# Patient Record
Sex: FEMALE | Race: WHITE | NOT HISPANIC OR LATINO | Employment: FULL TIME | URBAN - METROPOLITAN AREA
[De-identification: names, ages, dates, MRNs, and addresses within clinical notes are randomized per-mention and may not be internally consistent; named-entity substitution may affect disease eponyms.]

---

## 2023-12-29 ENCOUNTER — PATIENT ROUNDING (BHMG ONLY) (OUTPATIENT)
Dept: FAMILY MEDICINE CLINIC | Facility: CLINIC | Age: 33
End: 2023-12-29
Payer: OTHER GOVERNMENT

## 2023-12-29 ENCOUNTER — OFFICE VISIT (OUTPATIENT)
Dept: FAMILY MEDICINE CLINIC | Facility: CLINIC | Age: 33
End: 2023-12-29
Payer: OTHER GOVERNMENT

## 2023-12-29 VITALS
DIASTOLIC BLOOD PRESSURE: 70 MMHG | BODY MASS INDEX: 32.99 KG/M2 | WEIGHT: 198 LBS | TEMPERATURE: 97.8 F | HEIGHT: 65 IN | HEART RATE: 78 BPM | RESPIRATION RATE: 18 BRPM | OXYGEN SATURATION: 100 % | SYSTOLIC BLOOD PRESSURE: 106 MMHG

## 2023-12-29 DIAGNOSIS — Z76.89 ENCOUNTER TO ESTABLISH CARE: ICD-10-CM

## 2023-12-29 DIAGNOSIS — E66.9 CLASS 1 OBESITY WITH BODY MASS INDEX (BMI) OF 32.0 TO 32.9 IN ADULT, UNSPECIFIED OBESITY TYPE, UNSPECIFIED WHETHER SERIOUS COMORBIDITY PRESENT: ICD-10-CM

## 2023-12-29 DIAGNOSIS — Z11.59 NEED FOR HEPATITIS C SCREENING TEST: ICD-10-CM

## 2023-12-29 DIAGNOSIS — L73.2 HIDRADENITIS SUPPURATIVA: Primary | ICD-10-CM

## 2023-12-29 PROBLEM — K64.4 EXTERNAL HEMORRHOIDS: Status: ACTIVE | Noted: 2022-12-19

## 2023-12-29 PROBLEM — F90.2 ATTENTION DEFICIT HYPERACTIVITY DISORDER, COMBINED TYPE: Status: ACTIVE | Noted: 2022-05-17

## 2023-12-29 PROBLEM — Z63.0 PROBLEMS IN RELATIONSHIP WITH SPOUSE OR PARTNER: Status: RESOLVED | Noted: 2018-03-29 | Resolved: 2023-12-29

## 2023-12-29 PROBLEM — Z02.89 HEALTH EXAMINATION OF DEFINED SUBPOPULATION: Status: RESOLVED | Noted: 2023-12-29 | Resolved: 2023-12-29

## 2023-12-29 PROBLEM — E53.8 VITAMIN B12 DEFICIENCY (NON ANEMIC): Status: ACTIVE | Noted: 2022-03-29

## 2023-12-29 PROBLEM — F50.819 BINGE EATING DISORDER: Status: ACTIVE | Noted: 2022-03-28

## 2023-12-29 PROBLEM — F43.29 MIXED EMOTIONAL FEATURES AS ADJUSTMENT REACTION: Status: ACTIVE | Noted: 2018-02-02

## 2023-12-29 PROBLEM — L70.0 CYSTIC ACNE: Status: ACTIVE | Noted: 2021-04-12

## 2023-12-29 PROBLEM — H52.10 MYOPIA: Status: RESOLVED | Noted: 2023-12-29 | Resolved: 2023-12-29

## 2023-12-29 PROBLEM — F32.9 MAJOR DEPRESSION, SINGLE EPISODE: Status: ACTIVE | Noted: 2023-12-29

## 2023-12-29 PROBLEM — F50.81 BINGE EATING DISORDER: Status: ACTIVE | Noted: 2022-03-28

## 2023-12-29 PROCEDURE — 86803 HEPATITIS C AB TEST: CPT | Performed by: NURSE PRACTITIONER

## 2023-12-29 PROCEDURE — 82607 VITAMIN B-12: CPT | Performed by: NURSE PRACTITIONER

## 2023-12-29 PROCEDURE — 99204 OFFICE O/P NEW MOD 45 MIN: CPT | Performed by: NURSE PRACTITIONER

## 2023-12-29 PROCEDURE — 36415 COLL VENOUS BLD VENIPUNCTURE: CPT | Performed by: NURSE PRACTITIONER

## 2023-12-29 PROCEDURE — 81001 URINALYSIS AUTO W/SCOPE: CPT | Performed by: NURSE PRACTITIONER

## 2023-12-29 PROCEDURE — 84443 ASSAY THYROID STIM HORMONE: CPT | Performed by: NURSE PRACTITIONER

## 2023-12-29 PROCEDURE — 80053 COMPREHEN METABOLIC PANEL: CPT | Performed by: NURSE PRACTITIONER

## 2023-12-29 PROCEDURE — 83036 HEMOGLOBIN GLYCOSYLATED A1C: CPT | Performed by: NURSE PRACTITIONER

## 2023-12-29 PROCEDURE — 80061 LIPID PANEL: CPT | Performed by: NURSE PRACTITIONER

## 2023-12-29 PROCEDURE — 85025 COMPLETE CBC W/AUTO DIFF WBC: CPT | Performed by: NURSE PRACTITIONER

## 2023-12-29 RX ORDER — EPINEPHRINE 0.3 MG/.3ML
INJECTION SUBCUTANEOUS
COMMUNITY

## 2023-12-29 RX ORDER — NALTREXONE HYDROCHLORIDE 50 MG/1
1 TABLET, FILM COATED ORAL DAILY
COMMUNITY
Start: 2022-05-28

## 2023-12-29 NOTE — PROGRESS NOTES
Venipuncture Blood Specimen Collection  Venipuncture performed in RA by Lilly Jernigan MA with good hemostasis. Patient tolerated the procedure well without complications.   12/29/23   Lilly Jernigan MA

## 2023-12-29 NOTE — PROGRESS NOTES
"Chief Complaint  Establish Care (Previous PCP Dr. Gabrielle Marley in [place], [place] ) and Acne (Hidradenitis suppurativa. Was being treated with Humira. Would like referral to Dermatology to discuss alternative treatments)    Fatuma Bridges presents to [facility]  History of Present Illness    Patient presents today to establish care. Last PCP with Gabrielle Marley in [facility]; worked in [employer]- 8 years total. (Originally from [place]). 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      Obesity: Current status is stable.  Reported 80 pound weight loss since 2022.  Had a 1 month trial of Saxenda followed by no coverage of GLP-1.  Most recent weight loss medication included phentermine and naltrexone 50 mg daily; taken together for 1 year total; then no further changes in weight.  Has been off of phentermine for the past 6 to 7 months.      Acne/Hydradenitis:   Onset childhood. Frequency is continuously. Severity is moderate/severe. Status is improved. Location of affected area face/back, bilateral axillary, bilateral groins, bilateral breast. Quality is nodular, pustular.  No current medication. Has not worn underwear since childhood due to cysts; Doxycycline, Accutane, topical steroids, Humira, \"M?\"  Stopped Humira (mother works in grade school as teachers aid and bringing home bugs; does not want to be immunosuppressed; no problems with URI while in [place]). Dermatology excised  several affected areas.  Aggravated by dietary changes, stress, and clothing. Relieved by Humira (made big difference 90%).  Pertinent negatives include bleeding, itching, edema, erythema, and drainage.       Objective   Vital Signs:  /70 (BP Location: Left arm, Patient Position: Sitting, Cuff Size: Adult)   Pulse 78   Temp 97.8 °F (36.6 °C) (Temporal)   Resp 18   Ht 165.1 cm (65\")   Wt 89.8 kg " "(198 lb)   SpO2 100% Comment: Room air  BMI 32.95 kg/m²   Estimated body mass index is 32.95 kg/m² as calculated from the following:    Height as of this encounter: 165.1 cm (65\").    Weight as of this encounter: 89.8 kg (198 lb).             Physical Exam  Constitutional:       General: She is not in acute distress.     Appearance: She is obese.      Comments: Pleasant, converses appropriately    HENT:      Head: Normocephalic and atraumatic.      Right Ear: Tympanic membrane, ear canal and external ear normal.      Left Ear: Tympanic membrane, ear canal and external ear normal.      Nose: Nose normal.      Mouth/Throat:      Lips: Pink.      Mouth: Mucous membranes are moist.      Pharynx: Oropharynx is clear.   Eyes:      Conjunctiva/sclera: Conjunctivae normal.   Cardiovascular:      Rate and Rhythm: Normal rate and regular rhythm.      Chest Wall: PMI is not displaced.      Pulses: Normal pulses.      Heart sounds: Normal heart sounds, S1 normal and S2 normal.   Pulmonary:      Effort: Pulmonary effort is normal.      Breath sounds: Normal breath sounds.   Abdominal:      General: Bowel sounds are normal.      Palpations: Abdomen is soft.      Tenderness: There is no abdominal tenderness.   Musculoskeletal:         General: Normal range of motion.      Cervical back: Neck supple.   Lymphadenopathy:      Cervical: No cervical adenopathy.      Upper Body:      Right upper body: No supraclavicular adenopathy.      Left upper body: No supraclavicular adenopathy.   Skin:     General: Skin is warm and dry.      Comments: Areas examined include bilateral breast, bilateral axilla, and bilateral groin--mild/moderate scarring present and few scattered nodules in axilla/groin/s.  Negative for erythema, induration, and drainage.   Neurological:      Mental Status: She is alert and oriented to person, place, and time.      Gait: Gait is intact.   Psychiatric:         Attention and Perception: Attention normal.         " Mood and Affect: Mood and affect normal.         Speech: Speech normal.         Behavior: Behavior normal.         Thought Content: Thought content normal.         Judgment: Judgment normal.        Result Review :                   Assessment and Plan   Diagnoses and all orders for this visit:    1. Hidradenitis suppurativa (Primary)  -     Ambulatory Referral to Dermatology    2. Class 1 obesity with body mass index (BMI) of 32.0 to 32.9 in adult, unspecified obesity type, unspecified whether serious comorbidity present  -     Comprehensive Metabolic Panel  -     CBC & Differential  -     TSH  -     Hemoglobin A1c  -     Lipid Panel    3. Encounter to establish care  -     Comprehensive Metabolic Panel  -     CBC & Differential  -     Urinalysis With Culture If Indicated - Urine, Clean Catch  -     Lipid Panel  -     Vitamin B12    4. Need for hepatitis C screening test  -     Hepatitis C Antibody             Follow Up   Return in about 2 weeks (around 1/12/2024) for follow up obesity/new labs/ADHD--30 min .  Patient was given instructions and counseling regarding her condition or for health maintenance advice. Please see specific information pulled into the AVS if appropriate.

## 2023-12-30 LAB
ALBUMIN SERPL-MCNC: 4.7 G/DL (ref 3.5–5.2)
ALBUMIN/GLOB SERPL: 1.5 G/DL
ALP SERPL-CCNC: 60 U/L (ref 39–117)
ALT SERPL W P-5'-P-CCNC: 21 U/L (ref 1–33)
AMORPH URATE CRY URNS QL MICRO: ABNORMAL /HPF
ANION GAP SERPL CALCULATED.3IONS-SCNC: 12.2 MMOL/L (ref 5–15)
AST SERPL-CCNC: 22 U/L (ref 1–32)
BACTERIA UR QL AUTO: ABNORMAL /HPF
BASOPHILS # BLD AUTO: 0.05 10*3/MM3 (ref 0–0.2)
BASOPHILS NFR BLD AUTO: 0.7 % (ref 0–1.5)
BILIRUB SERPL-MCNC: 0.8 MG/DL (ref 0–1.2)
BILIRUB UR QL STRIP: NEGATIVE
BUN SERPL-MCNC: 10 MG/DL (ref 6–20)
BUN/CREAT SERPL: 13.7 (ref 7–25)
CALCIUM SPEC-SCNC: 9.2 MG/DL (ref 8.6–10.5)
CHLORIDE SERPL-SCNC: 105 MMOL/L (ref 98–107)
CHOLEST SERPL-MCNC: 170 MG/DL (ref 0–200)
CLARITY UR: ABNORMAL
CO2 SERPL-SCNC: 21.8 MMOL/L (ref 22–29)
COD CRY URNS QL: ABNORMAL /HPF
COLOR UR: ABNORMAL
CREAT SERPL-MCNC: 0.73 MG/DL (ref 0.57–1)
DEPRECATED RDW RBC AUTO: 40.7 FL (ref 37–54)
EGFRCR SERPLBLD CKD-EPI 2021: 110.1 ML/MIN/1.73
EOSINOPHIL # BLD AUTO: 0.11 10*3/MM3 (ref 0–0.4)
EOSINOPHIL NFR BLD AUTO: 1.5 % (ref 0.3–6.2)
ERYTHROCYTE [DISTWIDTH] IN BLOOD BY AUTOMATED COUNT: 12.4 % (ref 12.3–15.4)
GLOBULIN UR ELPH-MCNC: 3.1 GM/DL
GLUCOSE SERPL-MCNC: 86 MG/DL (ref 65–99)
GLUCOSE UR STRIP-MCNC: NEGATIVE MG/DL
HBA1C MFR BLD: 5 % (ref 4.8–5.6)
HCT VFR BLD AUTO: 40.5 % (ref 34–46.6)
HCV AB SER DONR QL: NORMAL
HDLC SERPL-MCNC: 60 MG/DL (ref 40–60)
HGB BLD-MCNC: 13.9 G/DL (ref 12–15.9)
HGB UR QL STRIP.AUTO: NEGATIVE
HOLD SPECIMEN: NORMAL
HYALINE CASTS UR QL AUTO: ABNORMAL /LPF
IMM GRANULOCYTES # BLD AUTO: 0.02 10*3/MM3 (ref 0–0.05)
IMM GRANULOCYTES NFR BLD AUTO: 0.3 % (ref 0–0.5)
KETONES UR QL STRIP: ABNORMAL
LDLC SERPL CALC-MCNC: 99 MG/DL (ref 0–100)
LDLC/HDLC SERPL: 1.64 {RATIO}
LEUKOCYTE ESTERASE UR QL STRIP.AUTO: NEGATIVE
LYMPHOCYTES # BLD AUTO: 2.9 10*3/MM3 (ref 0.7–3.1)
LYMPHOCYTES NFR BLD AUTO: 38.6 % (ref 19.6–45.3)
MCH RBC QN AUTO: 30.9 PG (ref 26.6–33)
MCHC RBC AUTO-ENTMCNC: 34.3 G/DL (ref 31.5–35.7)
MCV RBC AUTO: 90 FL (ref 79–97)
MONOCYTES # BLD AUTO: 0.52 10*3/MM3 (ref 0.1–0.9)
MONOCYTES NFR BLD AUTO: 6.9 % (ref 5–12)
MUCOUS THREADS URNS QL MICRO: ABNORMAL /HPF
NEUTROPHILS NFR BLD AUTO: 3.91 10*3/MM3 (ref 1.7–7)
NEUTROPHILS NFR BLD AUTO: 52 % (ref 42.7–76)
NITRITE UR QL STRIP: NEGATIVE
NRBC BLD AUTO-RTO: 0 /100 WBC (ref 0–0.2)
PH UR STRIP.AUTO: 7 [PH] (ref 5–8)
PLATELET # BLD AUTO: 326 10*3/MM3 (ref 140–450)
PMV BLD AUTO: 11.2 FL (ref 6–12)
POTASSIUM SERPL-SCNC: 4.1 MMOL/L (ref 3.5–5.2)
PROT SERPL-MCNC: 7.8 G/DL (ref 6–8.5)
PROT UR QL STRIP: ABNORMAL
RBC # BLD AUTO: 4.5 10*6/MM3 (ref 3.77–5.28)
RBC # UR STRIP: ABNORMAL /HPF
REF LAB TEST METHOD: ABNORMAL
SODIUM SERPL-SCNC: 139 MMOL/L (ref 136–145)
SP GR UR STRIP: >=1.03 (ref 1–1.03)
SQUAMOUS #/AREA URNS HPF: ABNORMAL /HPF
TRIGL SERPL-MCNC: 58 MG/DL (ref 0–150)
TSH SERPL DL<=0.05 MIU/L-ACNC: 1.17 UIU/ML (ref 0.27–4.2)
UROBILINOGEN UR QL STRIP: ABNORMAL
VIT B12 BLD-MCNC: 821 PG/ML (ref 211–946)
VLDLC SERPL-MCNC: 11 MG/DL (ref 5–40)
WBC # UR STRIP: ABNORMAL /HPF
WBC NRBC COR # BLD AUTO: 7.51 10*3/MM3 (ref 3.4–10.8)

## 2024-02-02 ENCOUNTER — OFFICE VISIT (OUTPATIENT)
Dept: FAMILY MEDICINE CLINIC | Facility: CLINIC | Age: 34
End: 2024-02-02
Payer: OTHER GOVERNMENT

## 2024-02-02 VITALS
HEART RATE: 78 BPM | HEIGHT: 65 IN | WEIGHT: 192.4 LBS | TEMPERATURE: 98.9 F | OXYGEN SATURATION: 99 % | BODY MASS INDEX: 32.06 KG/M2 | DIASTOLIC BLOOD PRESSURE: 68 MMHG | SYSTOLIC BLOOD PRESSURE: 118 MMHG | RESPIRATION RATE: 18 BRPM

## 2024-02-02 DIAGNOSIS — F90.2 ATTENTION DEFICIT HYPERACTIVITY DISORDER, COMBINED TYPE: ICD-10-CM

## 2024-02-02 DIAGNOSIS — F32.A ANXIETY AND DEPRESSION: ICD-10-CM

## 2024-02-02 DIAGNOSIS — E66.09 CLASS 1 OBESITY DUE TO EXCESS CALORIES WITHOUT SERIOUS COMORBIDITY WITH BODY MASS INDEX (BMI) OF 32.0 TO 32.9 IN ADULT: Primary | ICD-10-CM

## 2024-02-02 DIAGNOSIS — F41.9 ANXIETY AND DEPRESSION: ICD-10-CM

## 2024-02-02 DIAGNOSIS — F50.81 BINGE EATING DISORDER: ICD-10-CM

## 2024-02-02 PROBLEM — E66.811 CLASS 1 OBESITY DUE TO EXCESS CALORIES WITHOUT SERIOUS COMORBIDITY WITH BODY MASS INDEX (BMI) OF 32.0 TO 32.9 IN ADULT: Status: ACTIVE | Noted: 2024-02-02

## 2024-02-02 LAB
AMPHET+METHAMPHET UR QL: NEGATIVE
BARBITURATES UR QL SCN: NEGATIVE
BENZODIAZ UR QL SCN: NEGATIVE
CANNABINOIDS SERPL QL: NEGATIVE
COCAINE UR QL: NEGATIVE
FENTANYL UR-MCNC: NEGATIVE NG/ML
METHADONE UR QL SCN: NEGATIVE
OPIATES UR QL: NEGATIVE
OXYCODONE UR QL SCN: NEGATIVE

## 2024-02-02 PROCEDURE — 80307 DRUG TEST PRSMV CHEM ANLYZR: CPT | Performed by: NURSE PRACTITIONER

## 2024-02-02 PROCEDURE — 99214 OFFICE O/P EST MOD 30 MIN: CPT | Performed by: NURSE PRACTITIONER

## 2024-02-02 RX ORDER — LISDEXAMFETAMINE DIMESYLATE CAPSULES 20 MG/1
20 CAPSULE ORAL EVERY MORNING
Qty: 30 CAPSULE | Refills: 0 | Status: SHIPPED | OUTPATIENT
Start: 2024-02-02

## 2024-02-02 RX ORDER — BUPROPION HYDROCHLORIDE 150 MG/1
150 TABLET ORAL DAILY
Qty: 30 TABLET | Refills: 0 | Status: SHIPPED | OUTPATIENT
Start: 2024-02-02

## 2024-02-02 NOTE — ASSESSMENT & PLAN NOTE
Contract signed.  Inspect reviewed.  UDS collected today.  Psychological condition is worsening.  Regular aerobic exercise.  Medication changes per orders.  Psychological condition  will be reassessed in 4 weeks.

## 2024-02-02 NOTE — ASSESSMENT & PLAN NOTE
Contract signed.  Inspect reviewed.  UDS collected today.  Psychological condition is worsening.  Medication changes per orders.  Psychological condition  will be reassessed in 4 weeks.

## 2024-02-02 NOTE — ASSESSMENT & PLAN NOTE
Start Wellbutrin 150 mg extended release daily.  Discussed benefits of individual therapy for both anxiety and depression.  Agreeable to follow through.  Follow-up in 3 to 4 weeks.

## 2024-02-02 NOTE — ASSESSMENT & PLAN NOTE
Patient's (Body mass index is 32.02 kg/m².) indicates that they are obese (BMI >30) with health conditions that include none . Weight is unchanged. BMI  is above average; BMI management plan is completed. We discussed portion control and increasing exercise. Discussed potential for weight loss with current medications which include Wellbutrin 150 and Vyvanse 20 mg.  Will continue to monitor for progress.

## 2024-02-02 NOTE — PROGRESS NOTES
Chief Complaint  Follow-up (She would like to get a referral to a therapist. )    Subjective        Stephenie Bridges presents to [facility]  History of Present Illness    Patient presents today to follow-up on obesity, ADHD, and new patient labs.  Dermatology appointment scheduled with Chelle Malcolm.       Obesity:  Severity of symptoms moderate. Status is gaining weight. Risk factors include annual weight gain of > 2 pounds/year, family hx obesity, 165 pounds before leaving [place] June 2023.  Associated conditions: psych dysfunction.  Aggravated by binge eating, snacking, recent move and stress with parents.   Relieved by exercise at gym 3 days a week.  Associated symptoms include anxiety and depression.   No current menses; Mirena IUD in place.  Pertinent negatives include abdominal pain, anorexia, cold intolerance, constipation, delayed development, facial fullness, fatigue, generalized weakness, hair loss, headache, hirsutism, hoarseness, lethargy, low self-esteem, paresthesia, and vision changes.  Computer/television time hr/day:  8 hrs computer/1-2 hrs.       Anxiety:   Year of onset: 2023. Frequency of symptoms: daily. Status is worsening. Level of function not difficult at all to somewhat difficult.   Risk factors include: chronic illness, death of a friend or loved one (dad passed 2011) , family history of anxiety/depression, financial worries, and history of depression.   Aggravating factors include conflict or stress (outside influences). Relieving factors include exercise/physical activity, medication.  Presenting symptoms/pertinent negatives include: anxious thoughts-yes, difficulty concentrating-yes, difficulty falling asleep-yes, difficulty staying asleep-no, depressed mood-yes, excessive worry-yes, diminished interest or pleasure-no, compulsive thoughts-no, decreased need for sleep-no, easily startled-yes, fatigue-no, feelings of guilty-no, feelings of vulnerability-yes  "(with recent move), increased energy-no, hallucinations-no, change in libido, loss of appetite-binge eating (carbs; bread; chips), paranoia-no, poor judgement-no, racing thoughts-yes, restlessness-no, thoughts of death or suicide-no.      History of ADHD:   Decreased attention w/work or home activities -yes  Makes careless mistakes -yes  Appears to not listen when addressed directly -no  Fails to follow through with work obligations or home activities - yes  Difficulty organizing tasks, activities, or belongings -yes  Avoids tasks which require consistent effort -no  Loses objects required for tasks or activities -yes  Easily distracted -yes  Forgetfulness with routine activities -yes       Objective   Vital Signs:  /68 (BP Location: Left arm, Patient Position: Sitting, Cuff Size: Adult)   Pulse 78   Temp 98.9 °F (37.2 °C)   Resp 18   Ht 165.1 cm (65\")   Wt 87.3 kg (192 lb 6.4 oz)   SpO2 99%   BMI 32.02 kg/m²   Estimated body mass index is 32.02 kg/m² as calculated from the following:    Height as of this encounter: 165.1 cm (65\").    Weight as of this encounter: 87.3 kg (192 lb 6.4 oz).             Physical Exam  Constitutional:       General: She is not in acute distress.     Appearance: She is well-groomed. She is obese.   HENT:      Head: Normocephalic.   Cardiovascular:      Rate and Rhythm: Normal rate and regular rhythm.      Chest Wall: PMI is not displaced.      Heart sounds: Normal heart sounds.   Pulmonary:      Effort: Pulmonary effort is normal.      Breath sounds: Normal breath sounds and air entry.   Musculoskeletal:      Cervical back: Neck supple.   Skin:     General: Skin is warm and dry.   Neurological:      Mental Status: She is alert and oriented to person, place, and time.      Gait: Gait is intact.   Psychiatric:         Attention and Perception: Attention normal.         Mood and Affect: Mood normal.         Speech: Speech normal.         Behavior: Behavior normal. Behavior is " cooperative.         Thought Content: Thought content normal.         Cognition and Memory: Memory normal.         Judgment: Judgment normal.        Result Review :      CMP          12/29/2023    10:23   CMP   Glucose 86    BUN 10    Creatinine 0.73    EGFR 110.1    Sodium 139    Potassium 4.1    Chloride 105    Calcium 9.2    Total Protein 7.8    Albumin 4.7    Globulin 3.1    Total Bilirubin 0.8    Alkaline Phosphatase 60    AST (SGOT) 22    ALT (SGPT) 21    Albumin/Globulin Ratio 1.5    BUN/Creatinine Ratio 13.7    Anion Gap 12.2      CBC w/diff          12/29/2023    10:23   CBC w/Diff   WBC 7.51    RBC 4.50    Hemoglobin 13.9    Hematocrit 40.5    MCV 90.0    MCH 30.9    MCHC 34.3    RDW 12.4    Platelets 326    Neutrophil Rel % 52.0    Immature Granulocyte Rel % 0.3    Lymphocyte Rel % 38.6    Monocyte Rel % 6.9    Eosinophil Rel % 1.5    Basophil Rel % 0.7      Lipid Panel          12/29/2023    10:23   Lipid Panel   Total Cholesterol 170    Triglycerides 58    HDL Cholesterol 60    VLDL Cholesterol 11    LDL Cholesterol  99    LDL/HDL Ratio 1.64      TSH          12/29/2023    10:23   TSH   TSH 1.170      Most Recent A1C          12/29/2023    10:23   HGBA1C Most Recent   Hemoglobin A1C 5.00      UA          12/29/2023    10:23   Urinalysis   Squamous Epithelial Cells, UA 13-20    Specific Gravity, UA >=1.030    Ketones, UA Trace    Blood, UA Negative    Leukocytes, UA Negative    Nitrite, UA Negative    RBC, UA None Seen    WBC, UA 0-2    Bacteria, UA Trace            12/29/23   Vitamin B-12  211 - 946 pg/mL 821     Hepatitis C Ab  Non-Reactive Non-Reactive     RBC, UA  None Seen, 0-2 /HPF None Seen   WBC, UA  None Seen, 0-2 /HPF 0-2   Bacteria, UA  None Seen /HPF Trace Abnormal    Squamous Epithelial Cells, UA  None Seen, 0-2 /HPF 13-20 Abnormal    Hyaline Casts, UA  None Seen /LPF None Seen   Calcium Oxalate Crystals, UA  None Seen /HPF Small/1+   Amorphous Crystals, UA  None Seen /HPF Moderate/2+    Mucus, UA  None Seen, Trace /HPF Small/1+ Abnormal    Methodology Manual Light Microscopy            Assessment and Plan     Diagnoses and all orders for this visit:    1. Class 1 obesity due to excess calories without serious comorbidity with body mass index (BMI) of 32.0 to 32.9 in adult (Primary)  Assessment & Plan:  Patient's (Body mass index is 32.02 kg/m².) indicates that they are obese (BMI >30) with health conditions that include none . Weight is unchanged. BMI  is above average; BMI management plan is completed. We discussed portion control and increasing exercise. Discussed potential for weight loss with current medications which include Wellbutrin 150 and Vyvanse 20 mg.  Will continue to monitor for progress.      2. Anxiety and depression  Assessment & Plan:  Start Wellbutrin 150 mg extended release daily.  Discussed benefits of individual therapy for both anxiety and depression.  Agreeable to follow through.  Follow-up in 3 to 4 weeks.    Orders:  -     buPROPion XL (Wellbutrin XL) 150 MG 24 hr tablet; Take 1 tablet by mouth Daily.  Dispense: 30 tablet; Refill: 0  -     Urine Drug Screen - Urine, Clean Catch  -     Ambulatory Referral to Behavioral Health    3. Binge eating disorder  Assessment & Plan:  Contract signed.  Inspect reviewed.  UDS collected today.  Psychological condition is worsening.  Medication changes per orders.  Psychological condition  will be reassessed in 4 weeks.    Orders:  -     lisdexamfetamine (VYVANSE) 20 MG capsule; Take 1 capsule by mouth Every Morning  Dispense: 30 capsule; Refill: 0    4. Attention deficit hyperactivity disorder, combined type  Assessment & Plan:  Contract signed.  Inspect reviewed.  UDS collected today.  Psychological condition is worsening.  Regular aerobic exercise.  Medication changes per orders.  Psychological condition  will be reassessed in 4 weeks.    Orders:  -     lisdexamfetamine (VYVANSE) 20 MG capsule; Take 1 capsule by mouth Every Morning   Dispense: 30 capsule; Refill: 0             Follow Up     Return in about 4 weeks (around 3/1/2024) for follow up add,anxietyt/depression .  Patient was given instructions and counseling regarding her condition or for health maintenance advice. Please see specific information pulled into the AVS if appropriate.         Answers submitted by the patient for this visit:  Other (Submitted on 2/2/2024)  Please describe your symptoms.: Follow up for weight  Have you had these symptoms before?: Yes  How long have you been having these symptoms?: Greater than 2 weeks  Primary Reason for Visit (Submitted on 2/2/2024)  What is the primary reason for your visit?: Other

## 2024-03-04 DIAGNOSIS — F32.A ANXIETY AND DEPRESSION: ICD-10-CM

## 2024-03-04 DIAGNOSIS — F41.9 ANXIETY AND DEPRESSION: ICD-10-CM

## 2024-03-04 RX ORDER — BUPROPION HYDROCHLORIDE 150 MG/1
150 TABLET ORAL DAILY
Qty: 30 TABLET | Refills: 0 | Status: SHIPPED | OUTPATIENT
Start: 2024-03-04 | End: 2024-03-08 | Stop reason: SDUPTHER

## 2024-03-08 ENCOUNTER — OFFICE VISIT (OUTPATIENT)
Dept: FAMILY MEDICINE CLINIC | Facility: CLINIC | Age: 34
End: 2024-03-08
Payer: OTHER GOVERNMENT

## 2024-03-08 VITALS
WEIGHT: 201.8 LBS | RESPIRATION RATE: 18 BRPM | HEIGHT: 65 IN | SYSTOLIC BLOOD PRESSURE: 130 MMHG | OXYGEN SATURATION: 99 % | BODY MASS INDEX: 33.62 KG/M2 | DIASTOLIC BLOOD PRESSURE: 80 MMHG | HEART RATE: 90 BPM | TEMPERATURE: 98.2 F

## 2024-03-08 DIAGNOSIS — F32.A ANXIETY AND DEPRESSION: Primary | ICD-10-CM

## 2024-03-08 DIAGNOSIS — F50.81 BINGE EATING DISORDER: ICD-10-CM

## 2024-03-08 DIAGNOSIS — F90.2 ATTENTION DEFICIT HYPERACTIVITY DISORDER, COMBINED TYPE: ICD-10-CM

## 2024-03-08 DIAGNOSIS — F41.9 ANXIETY AND DEPRESSION: Primary | ICD-10-CM

## 2024-03-08 PROCEDURE — 99214 OFFICE O/P EST MOD 30 MIN: CPT | Performed by: NURSE PRACTITIONER

## 2024-03-08 RX ORDER — BUPROPION HYDROCHLORIDE 150 MG/1
150 TABLET ORAL DAILY
Qty: 90 TABLET | Refills: 0 | Status: SHIPPED | OUTPATIENT
Start: 2024-03-08

## 2024-03-08 NOTE — PROGRESS NOTES
"Chief Complaint  Follow-up    Subjective        Stephenie Bridges presents to [facility]  History of Present Illness    Patient presents today for follow-up on anxiety, depression, ADD, and binge eating.  Status is 25 % better. Current medication includes Wellbutrin 150 mg extended release daily. Vyvanse 20 mg daily not covered per insurance; working on PA. Stopped naltrexone. Feels like initial roller coaster with wellbutrin; fog cleared. Feels easier to have positive attitude.  Attention and concentration better now; getting more things accomplished. Not as easily distracted. Some anxious moments. Depression sneaks in on a rough day but now less frequent. Has more clarity during the day. Still binging some; had family size box of triscuts with dip (healthier choice of dip this time). Negative for any SI/HI. Has made contact with [facility] to set up appointment.       Objective   Vital Signs:  /80 (BP Location: Left arm, Patient Position: Sitting, Cuff Size: Adult)   Pulse 90   Temp 98.2 °F (36.8 °C)   Resp 18   Ht 165.1 cm (65\")   Wt 91.5 kg (201 lb 12.8 oz)   SpO2 99%   BMI 33.58 kg/m²   Estimated body mass index is 33.58 kg/m² as calculated from the following:    Height as of this encounter: 165.1 cm (65\").    Weight as of this encounter: 91.5 kg (201 lb 12.8 oz).               Physical Exam  Constitutional:       General: She is not in acute distress.     Appearance: She is obese.   HENT:      Head: Normocephalic.      Right Ear: External ear normal.      Left Ear: External ear normal.      Nose: Nose normal.   Eyes:      Conjunctiva/sclera: Conjunctivae normal.   Cardiovascular:      Rate and Rhythm: Normal rate and regular rhythm.      Chest Wall: PMI is not displaced.      Heart sounds: Normal heart sounds.   Pulmonary:      Effort: Pulmonary effort is normal.      Breath sounds: Normal breath sounds and air entry.   Musculoskeletal:      Cervical back: Neck " supple.   Skin:     General: Skin is warm and dry.   Neurological:      Mental Status: She is alert and oriented to person, place, and time.      Gait: Gait is intact.   Psychiatric:         Attention and Perception: Attention normal.         Mood and Affect: Mood normal.         Speech: Speech normal.         Behavior: Behavior normal.         Thought Content: Thought content normal.         Cognition and Memory: Memory normal.         Judgment: Judgment normal.        Result Review :                     Assessment and Plan     Diagnoses and all orders for this visit:    1. Anxiety and depression (Primary)  Assessment & Plan:  Continue Wellbutrin 150 mg XL daily.   Follow up with [facility] for therapy.   Follow up 3 months.     Orders:  -     buPROPion XL (WELLBUTRIN XL) 150 MG 24 hr tablet; Take 1 tablet by mouth Daily.  Dispense: 90 tablet; Refill: 0    2. Binge eating disorder  Assessment & Plan:  Psychological condition is unchanged.  Vyvanse 20 mg daily generic PA pending.  Willing to pay out of pocket cost with [employer] if needed.   UDS reviewed.   Contract has been signed.  INSPECT reviewed upon refill date/s.   Continue current treatment regimen.  Regular aerobic exercise.  Psychological condition  will be reassessed in 3 months.      3. Attention deficit hyperactivity disorder, combined type  Assessment & Plan:  Psychological condition is unchanged.  Vyvanse 20 mg daily generic PA pending.  Willing to pay out of pocket cost with [employer] if needed.   UDS reviewed.   Contract has been signed.  INSPECT reviewed upon refill date/s.   Continue current treatment regimen.  Regular aerobic exercise.  Psychological condition  will be reassessed in 3 months.               Follow Up     Return in about 3 months (around 6/8/2024) for follow up ADD/anxiety/depression/binge.  Patient was given instructions and counseling regarding her condition or for health maintenance advice. Please see specific information pulled into  the AVS if appropriate.         Answers submitted by the patient for this visit:  Other (Submitted on 3/8/2024)  Please describe your symptoms.: Follow up for medication  Have you had these symptoms before?: Yes  How long have you been having these symptoms?: Greater than 2 weeks  Please list any medications you are currently taking for this condition.: Wellbutrin  Primary Reason for Visit (Submitted on 3/8/2024)  What is the primary reason for your visit?: Other

## 2024-03-09 NOTE — ASSESSMENT & PLAN NOTE
Psychological condition is unchanged.  Vyvanse 20 mg daily generic PA pending.  Willing to pay out of pocket cost with [employer] if needed.   UDS reviewed.   Contract has been signed.  INSPECT reviewed upon refill date/s.   Continue current treatment regimen.  Regular aerobic exercise.  Psychological condition  will be reassessed in 3 months.

## 2024-03-09 NOTE — ASSESSMENT & PLAN NOTE
Continue Wellbutrin 150 mg XL daily.   Follow up with [facility] for therapy.   Follow up 3 months.

## 2024-03-09 NOTE — ASSESSMENT & PLAN NOTE
Psychological condition is unchanged.  Vyvanse 20 mg daily generic PA pending.  Willing to pay out of pocket cost with [employer] if needed.   UDS reviewed.   Contract has been signed.  INSPECT reviewed upon refill date/s.   Continue current treatment regimen.  Regular aerobic exercise.  Psychological condition  will be reassessed in 3 months.

## 2024-03-13 ENCOUNTER — TELEPHONE (OUTPATIENT)
Dept: FAMILY MEDICINE CLINIC | Facility: CLINIC | Age: 34
End: 2024-03-13

## 2024-03-13 NOTE — TELEPHONE ENCOUNTER
Caller: Stephenie Bridges    Relationship: Self    Best call back number: [phone]    PATIENT IS OUT OF HER VYVANSE MEDICATION.  THIS IS ON BACK ORDER AT MOST PHARMACY'S.    DUE TO IT BEING A CONTROLLED SUBSTANCE, THE PHARMACY'S WILL NOT TELL PATIENT WHETHER OR NOT THEY HAVE IT IN STOCK OR NOT AND STEPHENIE WASN'T SURE IF THAT WAS A REAL THING OR NOT.     SHE IS NEEDING OUR HELP FINDING THIS MEDICATION AND GET IT CALLED IN.    SHE APOLOGIZES AND WAS TRYING TO DO HER PART, BUT DOESN'T KNOW WHAT TO DO.    PLEASE ADVISE

## 2024-03-14 DIAGNOSIS — F50.81 BINGE EATING DISORDER: Primary | ICD-10-CM

## 2024-03-14 DIAGNOSIS — F90.2 ATTENTION DEFICIT HYPERACTIVITY DISORDER, COMBINED TYPE: ICD-10-CM

## 2024-03-14 RX ORDER — LISDEXAMFETAMINE DIMESYLATE CAPSULES 20 MG/1
20 CAPSULE ORAL EVERY MORNING
Qty: 30 CAPSULE | Refills: 0 | Status: SHIPPED | OUTPATIENT
Start: 2024-03-14

## 2024-04-18 DIAGNOSIS — F50.81 BINGE EATING DISORDER: ICD-10-CM

## 2024-04-18 DIAGNOSIS — F90.2 ATTENTION DEFICIT HYPERACTIVITY DISORDER, COMBINED TYPE: ICD-10-CM

## 2024-04-19 RX ORDER — LISDEXAMFETAMINE DIMESYLATE CAPSULES 20 MG/1
20 CAPSULE ORAL EVERY MORNING
Qty: 30 CAPSULE | Refills: 0 | Status: SHIPPED | OUTPATIENT
Start: 2024-04-19

## 2024-05-28 ENCOUNTER — TELEPHONE (OUTPATIENT)
Dept: FAMILY MEDICINE CLINIC | Facility: CLINIC | Age: 34
End: 2024-05-28
Payer: OTHER GOVERNMENT

## 2024-05-28 NOTE — TELEPHONE ENCOUNTER
Left Detailed voicemessage regarding referral to dermatology. Needing to know if patient has been scheduled, seen, or is still wanting to get scheduled     Relay

## 2024-06-14 ENCOUNTER — OFFICE VISIT (OUTPATIENT)
Dept: FAMILY MEDICINE CLINIC | Facility: CLINIC | Age: 34
End: 2024-06-14
Payer: OTHER GOVERNMENT

## 2024-06-14 VITALS
HEIGHT: 65 IN | RESPIRATION RATE: 18 BRPM | DIASTOLIC BLOOD PRESSURE: 88 MMHG | HEART RATE: 91 BPM | OXYGEN SATURATION: 97 % | TEMPERATURE: 98.7 F | BODY MASS INDEX: 34.99 KG/M2 | SYSTOLIC BLOOD PRESSURE: 120 MMHG | WEIGHT: 210 LBS

## 2024-06-14 DIAGNOSIS — F90.2 ATTENTION DEFICIT HYPERACTIVITY DISORDER, COMBINED TYPE: ICD-10-CM

## 2024-06-14 DIAGNOSIS — F32.A ANXIETY AND DEPRESSION: Primary | ICD-10-CM

## 2024-06-14 DIAGNOSIS — Z97.5 IUD (INTRAUTERINE DEVICE) IN PLACE: ICD-10-CM

## 2024-06-14 DIAGNOSIS — F41.9 ANXIETY AND DEPRESSION: Primary | ICD-10-CM

## 2024-06-14 DIAGNOSIS — Z12.4 ENCOUNTER FOR PAPANICOLAOU SMEAR FOR CERVICAL CANCER SCREENING: ICD-10-CM

## 2024-06-14 PROCEDURE — 99214 OFFICE O/P EST MOD 30 MIN: CPT | Performed by: NURSE PRACTITIONER

## 2024-06-14 RX ORDER — BUPROPION HYDROCHLORIDE 300 MG/1
300 TABLET ORAL DAILY
Qty: 90 TABLET | Refills: 0 | Status: SHIPPED | OUTPATIENT
Start: 2024-06-14

## 2024-06-14 RX ORDER — ATOMOXETINE 40 MG/1
40 CAPSULE ORAL DAILY
Qty: 30 CAPSULE | Refills: 2 | Status: SHIPPED | OUTPATIENT
Start: 2024-06-14
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
## 2024-07-09 ENCOUNTER — TELEPHONE (OUTPATIENT)
Dept: FAMILY MEDICINE CLINIC | Facility: CLINIC | Age: 34
End: 2024-07-09
Payer: OTHER GOVERNMENT

## 2024-08-03 ENCOUNTER — TELEMEDICINE (OUTPATIENT)
Dept: FAMILY MEDICINE CLINIC | Facility: TELEHEALTH | Age: 34
End: 2024-08-03
Payer: OTHER GOVERNMENT

## 2024-08-03 DIAGNOSIS — U07.1 COVID-19 VIRUS INFECTION: Primary | ICD-10-CM

## 2024-08-03 RX ORDER — BROMPHENIRAMINE MALEATE, PSEUDOEPHEDRINE HYDROCHLORIDE, AND DEXTROMETHORPHAN HYDROBROMIDE 2; 30; 10 MG/5ML; MG/5ML; MG/5ML
10 SYRUP ORAL 4 TIMES DAILY PRN
Qty: 200 ML | Refills: 0 | Status: SHIPPED | OUTPATIENT
Start: 2024-08-03

## 2024-08-03 NOTE — PROGRESS NOTES
You have chosen to receive care through a telehealth visit.  Do you consent to use a video/audio connection for your medical care today? Yes     CHIEF COMPLAINT  No chief complaint on file.        HPI  Stephenie Bridges is a 34 y.o. female  presents with complaint of tested positive for COVID today, symptoms began yesterday with fever, chills, nasal congestion, wheezing, mild cough.  Denies shortness of breath, sore throat, ear pain, headache.      Review of Systems  See HPI    Past Medical History:   Diagnosis Date    ADHD (attention deficit hyperactivity disorder) 2018    Health examination of defined subpopulation 12/29/2023    Problems in relationship with spouse or partner 03/29/2018       Family History   Problem Relation Age of Onset    Anxiety disorder Mother     Alcohol abuse Father         Passed in 2011       Social History     Socioeconomic History    Marital status: Single   Tobacco Use    Smoking status: Former     Current packs/day: 0.50     Average packs/day: 0.5 packs/day for 21.6 years (10.8 ttl pk-yrs)     Types: Cigarettes     Start date: 2003     Passive exposure: Past    Smokeless tobacco: Never   Vaping Use    Vaping status: Every Day    Start date: 10/1/2023    Substances: Nicotine    Devices: Disposable   Substance and Sexual Activity    Alcohol use: Not Currently     Alcohol/week: 2.0 standard drinks of alcohol     Types: 2 Cans of beer per week     Comment: When i am not taking my meds I enjoy a special occasion    Drug use: Never    Sexual activity: Yes     Partners: Female, Male     Birth control/protection: Condom, I.U.D., Same-sex partner       Stephenie Bridges  reports that she has quit smoking. Her smoking use included cigarettes. She started smoking about 21 years ago. She has a 10.8 pack-year smoking history. She has been exposed to tobacco smoke. She has never used smokeless tobacco.               There were no vitals taken for this visit.    PHYSICAL EXAM  Physical Exam    Constitutional: She is oriented to person, place, and time. She appears well-developed and well-nourished. She does not have a sickly appearance. She appears ill.   HENT:   Head: Normocephalic and atraumatic.   Pulmonary/Chest: Effort normal.  No respiratory distress (occasional cough during visit).  Neurological: She is alert and oriented to person, place, and time.           Diagnoses and all orders for this visit:    1. COVID-19 virus infection (Primary)  -     Nirmatrelvir & Ritonavir, 300mg/100mg, (PAXLOVID); Take 3 tablets by mouth 2 (Two) Times a Day for 5 days.  Dispense: 30 tablet; Refill: 0  -     brompheniramine-pseudoephedrine-DM 30-2-10 MG/5ML syrup; Take 10 mL by mouth 4 (Four) Times a Day As Needed for Congestion, Cough or Allergies.  Dispense: 200 mL; Refill: 0    --take medications as prescribed  --increase fluids, rest as needed, tylenol or ibuprofen for pain  --f/u in 5-7 days if no improvement        FOLLOW-UP  As discussed during visit with PCP/[facility] if no improvement or Urgent Care/Emergency Department if worsening of symptoms    Patient verbalizes understanding of medication dosage, comfort measures, instructions for treatment and follow-up.    Tory Dixon, ZITA  08/03/2024  12:42 EDT    The use of a video visit has been reviewed with the patient and verbal informed consent has been obtained. Myself and Stephenie Bridges participated in this visit. The patient is located in [address] [place] [address] [place] [facility].    I am located in [place], [place]. Mychart and Twilio were utilized. I spent 8 minutes in the patient's chart for this visit.      Note Disclaimer: At [facility], we believe that sharing information builds trust and better   relationships. You are receiving this note because you recently visited [facility]. It is possible you   will see health information before a provider has talked with you about it. This kind of information can   be easy to misunderstand. To  help you fully understand what it means for your health, we urge you to   discuss this note with your provider.

## 2024-08-03 NOTE — PATIENT INSTRUCTIONS
Here are the facts about Paxlovid. Please review and acknowledge.      Authorized Use     The FDA has authorized the emergency use of PAXLOVID, an investigational medicine, for the treatment of mild-to-moderate COVID-19 in adults and children (12 years of age and older weighing at least 88 pounds [40 kg]) with a positive test for the virus that causes COVID-19, and who are at high risk for progression to severe COVID-19, including hospitalization or death, under an EUA.     PAXLOVID is investigational because it is still being studied. There is limited information about the safety and effectiveness of using PAXLOVID to treat people with mild-to-moderate COVID-19.  FACT SHEET FOR PATIENTS, PARENTS, AND CAREGIVERS  EMERGENCY USE AUTHORIZATION (EUA) OF PAXLOVID  FOR CORONAVIRUS DISEASE 2019 (COVID-19)  You are being given this Fact Sheet because your healthcare provider believes it is   necessary to provide you with PAXLOVID for the treatment of mild-to-moderate   coronavirus disease (COVID-19) caused by the SARS-CoV-2 virus. This Fact Sheet   contains information to help you understand the risks and benefits of taking the   PAXLOVID you have received or may receive.   The U.S. Food and Drug Administration (FDA) has issued an Emergency Use   Authorization (EUA) to make PAXLOVID available during the COVID-19 pandemic (for   more details about an EUA please see “What is an Emergency Use Authorization?”   at the end of this document). PAXLOVID is not an FDA-approved medicine in the   [place]. Read this Fact Sheet for information about PAXLOVID. Talk to your   healthcare provider about your options or if you have any questions. It is your choice to   take PAXLOVID.     What is COVID-19?  COVID-19 is caused by a virus called a coronavirus. You can get COVID-19 through   close contact with another person who has the virus.   COVID-19 illnesses have ranged from very mild-to-severe, including illness resulting in    death. While information so far suggests that most COVID-19 illness is mild, serious   illness can happen and may cause some of your other medical conditions to become   worse. Older people and people of all ages with severe, long lasting (chronic) medical   conditions like heart disease, lung disease, and diabetes, for example seem to be at   higher risk of being hospitalized for COVID-19.     What is PAXLOVID?  PAXLOVID is an investigational medicine used to treat mild-to-moderate COVID-19 in   adults and children [12 years of age and older weighing at least 88 pounds (40 kg)] with   positive results of direct SARS-CoV-2 viral testing, and who are at high risk for   progression to severe COVID-19, including hospitalization or death. PAXLOVID is   investigational because it is still being studied. There is limited information about the   safety and effectiveness of using PAXLOVID to treat people with mild-to-moderate   COVID-19.    The FDA has authorized the emergency use of PAXLOVID for the treatment of mild-tomoderate COVID-19 in adults and children [12 years of age and older weighing at least   88 pounds (40 kg)] with a positive test for the virus that causes COVID-19, and who are   at high risk for progression to severe COVID-19, including hospitalization or death,   under an EUA.  What should I tell my healthcare provider before I take PAXLOVID?  Tell your healthcare provider if you:   Have any allergies   Have liver or kidney disease   Are pregnant or plan to become pregnant   Are breastfeeding a child   Have any serious illnesses    Tell your healthcare provider about all the medicines you take, including   prescription and over-the-counter medicines, vitamins, and herbal supplements.   Some medicines may interact with PAXLOVID and may cause serious side effects.   Keep a list of your medicines to show your healthcare provider and pharmacist when   you get a new medicine.   You can ask your healthcare  provider or pharmacist for a list of medicines that interact   with PAXLOVID. Do not start taking a new medicine without telling your   healthcare provider. Your healthcare provider can tell you if it is safe to take   PAXLOVID with other medicines.   Tell your healthcare provider if you are taking combined hormonal contraceptive.  PAXLOVID may affect how your birth control pills work. Females who are able to   become pregnant should use another effective alternative form of contraception or an   additional barrier method of contraception. Talk to your healthcare provider if you have   any questions about contraceptive methods that might be right for you.    How do I take PAXLOVID?    PAXLOVID consists of 2 medicines: nirmatrelvir and ritonavir.  o Take 2 pink tablets of nirmatrelvir with 1 white tablet of ritonavir by mouth  2 times each day (in the morning and in the evening) for 5 days. For each  dose, take all 3 tablets at the same time.  o If you have kidney disease, talk to your healthcare provider. You  may need a different dose.   Swallow the tablets whole. Do not chew, break, or crush the tablets.   Take PAXLOVID with or without food.   Do not stop taking PAXLOVID without talking to your healthcare provider, even if  you feel better.   If you miss a dose of PAXLOVID within 8 hours of the time it is usually taken,  take it as soon as you remember. If you miss a dose by more than 8 hours, skip  the missed dose and take the next dose at your regular time. Do not take  2 doses of PAXLOVID at the same time.   If you take too much PAXLOVID, call your healthcare provider or go to the  nearest hospital emergency room right away.   If you are taking a ritonavir- or cobicistat-containing medicine to treat hepatitis C  or Human Immunodeficiency Virus (HIV), you should continue to take your  medicine as prescribed by your healthcare provider.  3   Talk to your healthcare provider if you do not feel better or if you  feel worse after   5 days.    Who should generally not take PAXLOVID?   Do not take PAXLOVID if:   You are allergic to nirmatrelvir, ritonavir, or any of the ingredients in PAXLOVID.   You are taking any of the following medicines:  o Alfuzosin  o Pethidine, piroxicam, propoxyphene  o Ranolazine  o Amiodarone, dronedarone, flecainide, propafenone, quinidine  o Colchicine  o Lurasidone, pimozide, clozapine  o Dihydroergotamine, ergotamine, methylergonovine  o Lovastatin, simvastatin  o Sildenafil (Revatio®) for pulmonary arterial hypertension (PAH)  o Triazolam, oral midazolam  o Apalutamide  o Carbamazepine, phenobarbital, phenytoin  o Rifampin  o Onofre’s Wort (hypericum perforatum)  Taking PAXLOVID with these medicines may cause serious or life-threatening side   effects or affect how PAXLOVID works.     These are not the only medicines that may cause serious side effects if taken with   PAXLOVID. PAXLOVID may increase or decrease the levels of multiple other   medicines. It is very important to tell your healthcare provider about all of the medicines   you are taking because additional laboratory tests or changes in the dose of your other   medicines may be necessary while you are taking PAXLOVID. Your healthcare provider   may also tell you about specific symptoms to watch out for that may indicate that you   need to stop or decrease the dose of some of your other medicines.    What are the important possible side effects of PAXLOVID?   Possible side effects of PAXLOVID are:   Liver Problems. Tell your healthcare provider right away if you have any of  these signs and symptoms of liver problems: loss of appetite, yellowing of your  skin and the whites of eyes (jaundice), dark-colored urine, pale colored stools  and itchy skin, stomach area (abdominal) pain.   Resistance to HIV Medicines. If you have untreated HIV infection, PAXLOVID  may lead to some HIV medicines not working as well in the future.  4    Other  possible side effects include:  o altered sense of taste  o diarrhea  o high blood pressure  o muscle aches  These are not all the possible side effects of PAXLOVID. Not many people have taken  PAXLOVID. Serious and unexpected side effects may happen. PAXLOVID is still being   studied, so it is possible that all of the risks are not known at this time.    What other treatment choices are there?  Like PAXLOVID, FDA may allow for the emergency use of other medicines to treat   people with COVID-19. Go to https://www.fda.gov/emergency-preparedness-andresponse/mcm-legal-regulatory-and-policy-framework/emergency-use-authorization for   information on the emergency use of other medicines that are authorized by FDA to   treat people with COVID-19. Your healthcare provider may talk with you about clinical   trials for which you may be eligible.   It is your choice to be treated or not to be treated with PAXLOVID. Should you decide   not to receive it or for your child not to receive it, it will not change your standard   medical care.    What if I am pregnant or breastfeeding?   There is no experience treating pregnant women or breastfeeding mothers with   PAXLOVID. For a mother and unborn baby, the benefit of taking PAXLOVID may be   greater than the risk from the treatment. If you are pregnant, discuss your options and   specific situation with your healthcare provider.   It is recommended that you use effective barrier contraception or do not have sexual   activity while taking PAXLOVID.   If you are breastfeeding, discuss your options and specific situation with your   healthcare provider.     How do I report side effects with PAXLOVID?   Contact your healthcare provider if you have any side effects that bother you or do not   go away.   Report side effects to FDA MedWatch at www.fda.gov/medwatch or call 5-090-VSJ0183 or you can report side effects to [employer] at the contact information provided   below.  Website Fax  number Telephone number  [email or web address] [phone]  5     How should I store PAXLOVID?   Store PAXLOVID tablets at room temperature, between 68?F to 77?F (20?C to 25?C).  How can I learn more about COVID-19?    Ask your healthcare provider.   Visit https://www.cdc.gov/COVID19.   Contact your local or state public health department.  What is an Emergency Use Authorization (EUA)?   The [place] FDA has made PAXLOVID available under an emergency access   mechanism called an Emergency Use Authorization (EUA). The EUA is supported by a    of Health and Human Service (HHS) declaration that circumstances exist to   justify the emergency use of drugs and biological products during the COVID-19   pandemic.     PAXLOVID for the treatment of mild-to-moderate COVID-19 in adults and children   [12 years of age and older weighing at least 88 pounds (40 kg)] with positive results of   direct SARS-CoV-2 viral testing, and who are at high risk for progression to severe   COVID-19, including hospitalization or death, has not undergone the same type of   review as an FDA-approved product. In issuing an EUA under the COVID-19 public   health emergency, the FDA has determined, among other things, that based on the   total amount of scientific evidence available including data from adequate and   well-controlled clinical trials, if available, it is reasonable to believe that the product may   be effective for diagnosing, treating, or preventing COVID-19, or a serious or   life-threatening disease or condition caused by COVID-19; that the known and potential   benefits of the product, when used to diagnose, treat, or prevent such disease or   condition, outweigh the known and potential risks of such product; and that there are no   adequate, approved, and available alternatives.    All of these criteria must be met to allow for the product to be used in the treatment of   patients during the  COVID-19 pandemic. The EUA for PAXLOVID is in effect for the   duration of the COVID-19 declaration justifying emergency use of this product, unless  terminated or revoked (after which the products may no longer be used under the   EUA).  6     Additional Information  For general questions, visit the website or call the telephone number provided below.   Website Telephone number  [employer]  [phone]  (6-299-P15-[phone])  You can also go to www.[employer].Socitive or call [phone] for more   information.  [id number]   Revised: 12/22/2021

## 2024-09-13 DIAGNOSIS — F32.A ANXIETY AND DEPRESSION: ICD-10-CM

## 2024-09-13 DIAGNOSIS — F41.9 ANXIETY AND DEPRESSION: ICD-10-CM

## 2024-09-13 RX ORDER — BUPROPION HYDROCHLORIDE 300 MG/1
300 TABLET ORAL DAILY
Qty: 90 TABLET | Refills: 0 | Status: SHIPPED | OUTPATIENT
Start: 2024-09-13

## 2024-09-23 ENCOUNTER — TELEPHONE (OUTPATIENT)
Dept: FAMILY MEDICINE CLINIC | Facility: CLINIC | Age: 34
End: 2024-09-23
Payer: OTHER GOVERNMENT

## 2024-09-23 DIAGNOSIS — F90.2 ATTENTION DEFICIT HYPERACTIVITY DISORDER, COMBINED TYPE: ICD-10-CM

## 2024-09-23 RX ORDER — ATOMOXETINE 40 MG/1
40 CAPSULE ORAL DAILY
Qty: 30 CAPSULE | Refills: 0 | Status: SHIPPED | OUTPATIENT
Start: 2024-09-23

## 2024-12-04 ENCOUNTER — HOSPITAL ENCOUNTER (EMERGENCY)
Facility: HOSPITAL | Age: 34
Discharge: HOME | End: 2024-12-04
Admitting: EMERGENCY MEDICINE
Payer: OTHER GOVERNMENT

## 2024-12-04 ENCOUNTER — APPOINTMENT (OUTPATIENT)
Dept: CT IMAGING | Facility: HOSPITAL | Age: 34
End: 2024-12-04
Payer: OTHER GOVERNMENT

## 2024-12-04 VITALS
WEIGHT: 225.75 LBS | TEMPERATURE: 98.1 F | DIASTOLIC BLOOD PRESSURE: 60 MMHG | OXYGEN SATURATION: 97 % | RESPIRATION RATE: 20 BRPM | HEIGHT: 65 IN | BODY MASS INDEX: 37.61 KG/M2 | SYSTOLIC BLOOD PRESSURE: 106 MMHG | HEART RATE: 83 BPM

## 2024-12-04 DIAGNOSIS — K59.00 CONSTIPATION, UNSPECIFIED CONSTIPATION TYPE: ICD-10-CM

## 2024-12-04 DIAGNOSIS — R93.89 ABNORMAL CT SCAN: ICD-10-CM

## 2024-12-04 DIAGNOSIS — R10.9 ABDOMINAL PAIN, UNSPECIFIED ABDOMINAL LOCATION: ICD-10-CM

## 2024-12-04 DIAGNOSIS — B34.8 RHINOVIRUS: Primary | ICD-10-CM

## 2024-12-04 DIAGNOSIS — R50.9 FEVER, UNSPECIFIED FEVER CAUSE: ICD-10-CM

## 2024-12-04 LAB
ALBUMIN SERPL-MCNC: 4.5 G/DL (ref 3.5–5.2)
ALBUMIN/GLOB SERPL: 1.4 G/DL
ALP SERPL-CCNC: 74 U/L (ref 39–117)
ALT SERPL W P-5'-P-CCNC: 17 U/L (ref 1–33)
ANION GAP SERPL CALCULATED.3IONS-SCNC: 11 MMOL/L (ref 5–15)
AST SERPL-CCNC: 23 U/L (ref 1–32)
B PARAPERT DNA SPEC QL NAA+PROBE: NOT DETECTED
B PERT DNA SPEC QL NAA+PROBE: NOT DETECTED
BASOPHILS # BLD AUTO: 0.06 10*3/MM3 (ref 0–0.2)
BASOPHILS NFR BLD AUTO: 0.6 % (ref 0–1.5)
BILIRUB SERPL-MCNC: 0.4 MG/DL (ref 0–1.2)
BUN SERPL-MCNC: 9 MG/DL (ref 6–20)
BUN/CREAT SERPL: 13.2 (ref 7–25)
C PNEUM DNA NPH QL NAA+NON-PROBE: NOT DETECTED
CALCIUM SPEC-SCNC: 9.7 MG/DL (ref 8.6–10.5)
CHLORIDE SERPL-SCNC: 103 MMOL/L (ref 98–107)
CO2 SERPL-SCNC: 23 MMOL/L (ref 22–29)
CREAT SERPL-MCNC: 0.68 MG/DL (ref 0.57–1)
DEPRECATED RDW RBC AUTO: 41.7 FL (ref 37–54)
EGFRCR SERPLBLD CKD-EPI 2021: 115.9 ML/MIN/1.73
EOSINOPHIL # BLD AUTO: 0.07 10*3/MM3 (ref 0–0.4)
EOSINOPHIL NFR BLD AUTO: 0.6 % (ref 0.3–6.2)
ERYTHROCYTE [DISTWIDTH] IN BLOOD BY AUTOMATED COUNT: 12.8 % (ref 12.3–15.4)
FLUAV SUBTYP SPEC NAA+PROBE: NOT DETECTED
FLUBV RNA ISLT QL NAA+PROBE: NOT DETECTED
GLOBULIN UR ELPH-MCNC: 3.2 GM/DL
GLUCOSE SERPL-MCNC: 125 MG/DL (ref 65–99)
HADV DNA SPEC NAA+PROBE: NOT DETECTED
HCOV 229E RNA SPEC QL NAA+PROBE: NOT DETECTED
HCOV HKU1 RNA SPEC QL NAA+PROBE: NOT DETECTED
HCOV NL63 RNA SPEC QL NAA+PROBE: NOT DETECTED
HCOV OC43 RNA SPEC QL NAA+PROBE: NOT DETECTED
HCT VFR BLD AUTO: 39.9 % (ref 34–46.6)
HETEROPH AB SER QL LA: NEGATIVE
HGB BLD-MCNC: 13.2 G/DL (ref 12–15.9)
HMPV RNA NPH QL NAA+NON-PROBE: NOT DETECTED
HOLD SPECIMEN: NORMAL
HOLD SPECIMEN: NORMAL
HPIV1 RNA ISLT QL NAA+PROBE: NOT DETECTED
HPIV2 RNA SPEC QL NAA+PROBE: NOT DETECTED
HPIV3 RNA NPH QL NAA+PROBE: NOT DETECTED
HPIV4 P GENE NPH QL NAA+PROBE: NOT DETECTED
IMM GRANULOCYTES # BLD AUTO: 0.02 10*3/MM3 (ref 0–0.05)
IMM GRANULOCYTES NFR BLD AUTO: 0.2 % (ref 0–0.5)
LYMPHOCYTES # BLD AUTO: 2.39 10*3/MM3 (ref 0.7–3.1)
LYMPHOCYTES NFR BLD AUTO: 21.9 % (ref 19.6–45.3)
M PNEUMO IGG SER IA-ACNC: NOT DETECTED
MCH RBC QN AUTO: 29.3 PG (ref 26.6–33)
MCHC RBC AUTO-ENTMCNC: 33.1 G/DL (ref 31.5–35.7)
MCV RBC AUTO: 88.7 FL (ref 79–97)
MONOCYTES # BLD AUTO: 0.67 10*3/MM3 (ref 0.1–0.9)
MONOCYTES NFR BLD AUTO: 6.1 % (ref 5–12)
NEUTROPHILS NFR BLD AUTO: 7.69 10*3/MM3 (ref 1.7–7)
NEUTROPHILS NFR BLD AUTO: 70.6 % (ref 42.7–76)
NRBC BLD AUTO-RTO: 0 /100 WBC (ref 0–0.2)
PLATELET # BLD AUTO: 270 10*3/MM3 (ref 140–450)
PMV BLD AUTO: 10.1 FL (ref 6–12)
POTASSIUM SERPL-SCNC: 3.7 MMOL/L (ref 3.5–5.2)
PROT SERPL-MCNC: 7.7 G/DL (ref 6–8.5)
RBC # BLD AUTO: 4.5 10*6/MM3 (ref 3.77–5.28)
RHINOVIRUS RNA SPEC NAA+PROBE: DETECTED
RSV RNA NPH QL NAA+NON-PROBE: NOT DETECTED
S PYO AG THROAT QL: NEGATIVE
SARS-COV-2 RNA NPH QL NAA+NON-PROBE: NOT DETECTED
SODIUM SERPL-SCNC: 137 MMOL/L (ref 136–145)
WBC NRBC COR # BLD AUTO: 10.9 10*3/MM3 (ref 3.4–10.8)
WHOLE BLOOD HOLD COAG: NORMAL
WHOLE BLOOD HOLD SPECIMEN: NORMAL

## 2024-12-04 PROCEDURE — 0202U NFCT DS 22 TRGT SARS-COV-2: CPT | Performed by: NURSE PRACTITIONER

## 2024-12-04 PROCEDURE — 74177 CT ABD & PELVIS W/CONTRAST: CPT

## 2024-12-04 PROCEDURE — 86308 HETEROPHILE ANTIBODY SCREEN: CPT | Performed by: NURSE PRACTITIONER

## 2024-12-04 PROCEDURE — 25510000001 IOPAMIDOL PER 1 ML: Performed by: NURSE PRACTITIONER

## 2024-12-04 PROCEDURE — 87651 STREP A DNA AMP PROBE: CPT | Performed by: NURSE PRACTITIONER

## 2024-12-04 PROCEDURE — 99285 EMERGENCY DEPT VISIT HI MDM: CPT

## 2024-12-04 PROCEDURE — 85025 COMPLETE CBC W/AUTO DIFF WBC: CPT | Performed by: NURSE PRACTITIONER

## 2024-12-04 PROCEDURE — 80053 COMPREHEN METABOLIC PANEL: CPT | Performed by: NURSE PRACTITIONER

## 2024-12-04 RX ORDER — BENZONATATE 100 MG/1
100 CAPSULE ORAL 3 TIMES DAILY PRN
Qty: 30 CAPSULE | Refills: 0 | Status: SHIPPED | OUTPATIENT
Start: 2024-12-04

## 2024-12-04 RX ORDER — IBUPROFEN 800 MG/1
800 TABLET, FILM COATED ORAL EVERY 6 HOURS PRN
Qty: 9 TABLET | Refills: 0 | Status: SHIPPED | OUTPATIENT
Start: 2024-12-04

## 2024-12-04 RX ORDER — FLUTICASONE PROPIONATE 50 MCG
2 SPRAY, SUSPENSION (ML) NASAL DAILY
Qty: 9.9 G | Refills: 0 | Status: SHIPPED | OUTPATIENT
Start: 2024-12-04

## 2024-12-04 RX ORDER — SODIUM CHLORIDE 0.9 % (FLUSH) 0.9 %
10 SYRINGE (ML) INJECTION AS NEEDED
Status: DISCONTINUED | OUTPATIENT
Start: 2024-12-04 | End: 2024-12-04 | Stop reason: HOSPADM

## 2024-12-04 RX ORDER — IOPAMIDOL 755 MG/ML
100 INJECTION, SOLUTION INTRAVASCULAR
Status: COMPLETED | OUTPATIENT
Start: 2024-12-04 | End: 2024-12-04

## 2024-12-04 RX ADMIN — IOPAMIDOL 100 ML: 755 INJECTION, SOLUTION INTRAVENOUS at 13:40

## 2024-12-04 NOTE — ED PROVIDER NOTES
Subjective   History of Present Illness  Chief complaint: Abdominal pain      Context: Patient is a 34-year-old female who was seen at urgent care today for right lower quadrant abdominal pain.  She states she has been having upper respiratory cough and congestion for the last couple weeks but started with a fever Tmax 101 last night.  Has had some constipation but was able to have a bowel movement yesterday.  Denies any urinary complaints.  Denies any unusual vaginal bleeding discharge or any recent antibiotics.        PCP: mike    LNMP:             Review of Systems   Constitutional:  Positive for fever.       Past Medical History:   Diagnosis Date    ADHD (attention deficit hyperactivity disorder) 2018    Health examination of defined subpopulation 12/29/2023    Problems in relationship with spouse or partner 03/29/2018       Allergies   Allergen Reactions    Bee Venom Anaphylaxis       Past Surgical History:   Procedure Laterality Date    TONSILLECTOMY  1992       Family History   Problem Relation Age of Onset    Anxiety disorder Mother     Alcohol abuse Father         Passed in 2011       Social History     Socioeconomic History    Marital status: Single   Tobacco Use    Smoking status: Former     Current packs/day: 0.50     Average packs/day: 0.5 packs/day for 21.9 years (11.0 ttl pk-yrs)     Types: Cigarettes     Start date: 2003     Passive exposure: Past    Smokeless tobacco: Never   Vaping Use    Vaping status: Every Day    Start date: 10/1/2023    Substances: Nicotine    Devices: Disposable   Substance and Sexual Activity    Alcohol use: Not Currently     Alcohol/week: 2.0 standard drinks of alcohol     Types: 2 Cans of beer per week     Comment: When i am not taking my meds I enjoy a special occasion    Drug use: Never    Sexual activity: Yes     Partners: Female, Male     Birth control/protection: Condom, I.U.D., Partner of same sex           Objective   Physical Exam    Vital signs and triage nurse  note reviewed.  Constitutional: Awake, alert; well-developed and well-nourished. No acute distress is noted.  HEENT: Normocephalic, + Congestion tonsils.  Surgically absent.  Neck: Supple, full range of motion without pain; no cervical lymphadenopathy.  Cardiovascular: Regular rate and rhythm, normal S1-S2.  Pulmonary: Respiratory effort regular nonlabored, breath sounds clear to auscultation all fields.  Abdomen: Soft, tender in the right lower quadrant without any peritonitis nondistended with normoactive bowel sounds; no rebound or guarding.  Negative Washburn McBurney  Musculoskeletal: Independent range of motion of all extremities with no palpable tenderness or edema.  Neuro: Alert oriented x3, speech is clear and appropriate, GCS 15  Skin:  Fleshtone warm, dry, intact; no erythematous or petechial rash or lesion      Procedures           ED Course                                           Labs Reviewed   RESPIRATORY PANEL PCR W/ COVID-19 (SARS-COV-2), NP SWAB IN UTM/VTP, 2 HR TAT - Abnormal; Notable for the following components:       Result Value    Human Rhinovirus/Enterovirus Detected (*)     All other components within normal limits    Narrative:     In the setting of a positive respiratory panel with a viral infection PLUS a negative procalcitonin without other underlying concern for bacterial infection, consider observing off antibiotics or discontinuation of antibiotics and continue supportive care. If the respiratory panel is positive for atypical bacterial infection (Bordetella pertussis, Chlamydophila pneumoniae, or Mycoplasma pneumoniae), consider antibiotic de-escalation to target atypical bacterial infection.   COMPREHENSIVE METABOLIC PANEL - Abnormal; Notable for the following components:    Glucose 125 (*)     All other components within normal limits    Narrative:     GFR Normal >60  Chronic Kidney Disease <60  Kidney Failure <15     CBC WITH AUTO DIFFERENTIAL - Abnormal; Notable for the  following components:    WBC 10.90 (*)     Neutrophils, Absolute 7.69 (*)     All other components within normal limits   RAPID STREP A SCREEN - Normal   MONONUCLEOSIS SCREEN - Normal   RAINBOW DRAW    Narrative:     The following orders were created for panel order [place] Draw.  Procedure                               Abnormality         Status                     ---------                               -----------         ------                     Green Top (Gel)[[id number]]                                  Final result               Lavender Top[[id number]]                                     Final result               Gold Top - SST[[id number]]                                   Final result               Light Blue Top[[id number]]                                   Final result                 Please view results for these tests on the individual orders.   GREEN TOP   LAVENDER TOP   GOLD TOP - SST   LIGHT BLUE TOP   CBC AND DIFFERENTIAL    Narrative:     The following orders were created for panel order CBC & Differential.  Procedure                               Abnormality         Status                     ---------                               -----------         ------                     CBC Auto Differential[[id number]]        Abnormal            Final result                 Please view results for these tests on the individual orders.     Medications   sodium chloride 0.9 % flush 10 mL (has no administration in time range)   iopamidol (ISOVUE-370) 76 % injection 100 mL (100 mL Intravenous Given 12/4/24 1340)       CT Abdomen Pelvis With Contrast    Result Date: 12/4/2024  Impression: 1. No acute CT findings. Normal appendix. 2. Punctate nonobstructing right renal calculus. No hydronephrosis. 3. Incidental low-density nodular thickening versus discrete 1.3 cm left adrenal nodule favoring benign adenoma. In the absence of any malignant history this can be reassessed by 1 year follow-up multiphase adrenal mass  "protocol CT or MRI to ensure stability. Electronically Signed: Skyler Workman MD  12/4/2024 1:47 PM EST  Workstation ID: [id number]    XR Abdomen KUB    Result Date: 12/4/2024  Impression: Large colonic stool burden suggesting constipation. Electronically Signed: Leoncio Barnes MD  12/4/2024 11:39 AM EST  Workstation ID: [id number]   Prior to Admission medications    Medication Sig Start Date End Date Taking? Authorizing Provider   atomoxetine (STRATTERA) 40 MG capsule TAKE 1 CAPSULE BY MOUTH EVERY DAY 9/23/24   Judy Mcelroy APRN   benzonatate (Tessalon Perles) 100 MG capsule Take 1 capsule by mouth 3 (Three) Times a Day As Needed for Cough. 12/4/24   Moriah Hernandez APRN   buPROPion XL (WELLBUTRIN XL) 300 MG 24 hr tablet TAKE 1 TABLET BY MOUTH EVERY DAY 9/13/24   Judy Mcelroy APRN   EPINEPHrine (EpiPen 2-Kamaljit) 0.3 MG/0.3ML solution auto-injector injection INJECT 0.3 MILLILITER (0.3 MG) BY INTRAMUSCULAR ROUTE ONCE AS NEEDED FOR ANAPHYLAXIS    ProviderFiordaliza MD   fluticasone (FLONASE) 50 MCG/ACT nasal spray Administer 2 sprays into the nostril(s) as directed by provider Daily. 12/4/24   Moriah Hernandez APRN   ibuprofen (ADVIL,MOTRIN) 800 MG tablet Take 1 tablet by mouth Every 6 (Six) Hours As Needed for Moderate Pain. 12/4/24   Moriah Hernandez APRN   brompheniramine-pseudoephedrine-DM 30-2-10 MG/5ML syrup Take 10 mL by mouth 4 (Four) Times a Day As Needed for Congestion, Cough or Allergies. 8/3/24 12/4/24  Tory Dixon APRN   Levonorgestrel (MIRENA) 20 MCG/DAY intrauterine device IUD  12/29/17 12/4/24  ProviderFiordaliza MD                   Medical Decision Making      /60   Pulse 83   Temp 98.1 °F (36.7 °C) (Oral)   Resp 20   Ht 165.1 cm (65\")   Wt 102 kg (225 lb 12 oz)   SpO2 97%   BMI 37.57 kg/m²      Chart review: 12/4/2024 seen by Dr. Chiu at urgent care and noted to have a large colonic stool burden on x-ray, negative UA COVID flu and pregnancy test    Radiology " interpretation: CT reviewed and interpreted by Lul: Normal appendix, adrenal thickening  Further interpretation by radiologist as above  Lab interpretation:  Labs all viewed by me and significant for, positive rhinovirus negative strep glucose 125 negative mono white count 10.9         Appropriate PPE worn during exam.  Patient had an IV established labs swabs CT obtained to evaluate for infection and electrolyte abnormalities dehydration infection appendicitis.  We discussed viral illness probably community at this time.  She was noted to the large stool burden on her x-ray at urgent care today.  We discussed the fever was likely related to her viral illness and will be encouraged symptomatic treatment and antibiotics were not warranted at this time.  On exam patient is resting comfortably no acute distress.  We discussed her  Normal CT imaging importance of follow-up with PCP for routine monitoring as she has no history of cancer.         i discussed findings with patient who voices understanding of discharge instructions, signs and symptoms requiring return to ED; discharged improved and in stable condition with follow up for re-evaluation.  This document is intended for medical expert use only. Reading of this document by patients and/or patient's family without participating medical staff guidance may result in misinterpretation and unintended morbidity.  Any interpretation of such data is the responsibility of the patient and/or family member responsible for the patient in concert with their primary or specialist providers, not to be left for sources of online searches such as [employer], [employer] or similar queries. Relying on these approaches to knowledge may result in misinterpretation, misguided goals of care and even death should patients or family members try recommendations outside of the realm of professional medical care in a supervised inpatient environment.         Problems Addressed:  Abdominal pain,  unspecified abdominal location: complicated acute illness or injury  Abnormal CT scan: complicated acute illness or injury  Constipation, unspecified constipation type: complicated acute illness or injury  Fever, unspecified fever cause: complicated acute illness or injury  Rhinovirus: complicated acute illness or injury    Amount and/or Complexity of Data Reviewed  Labs: ordered.  Radiology: ordered.    Risk  Prescription drug management.        Final diagnoses:   Rhinovirus   Abdominal pain, unspecified abdominal location   Constipation, unspecified constipation type   Abnormal CT scan   Fever, unspecified fever cause       ED Disposition  ED Disposition       ED Disposition   Discharge    Condition   Stable    Comment   --               Judy Mcelroy, APRN  [address] [facility] [address]  [place] [address]  [phone]               Medication List        New Prescriptions      benzonatate 100 MG capsule  Commonly known as: Tessalon Perles  Take 1 capsule by mouth 3 (Three) Times a Day As Needed for Cough.     fluticasone 50 MCG/ACT nasal spray  Commonly known as: FLONASE  Administer 2 sprays into the nostril(s) as directed by provider Daily.     ibuprofen 800 MG tablet  Commonly known as: ADVIL,MOTRIN  Take 1 tablet by mouth Every 6 (Six) Hours As Needed for Moderate Pain.            Stop      brompheniramine-pseudoephedrine-DM 30-2-10 MG/5ML syrup               Where to Get Your Medications        These medications were sent to [facility]/pharmacy #[id number] - [place], [place] - [address] [place] [address] - [phone]  - [phone]   [address] [place] [address]      Phone: [phone]   benzonatate 100 MG capsule  fluticasone 50 MCG/ACT nasal spray  ibuprofen 800 MG tablet            Moriah Hernandez, APRN  12/04/24 5520

## 2024-12-09 ENCOUNTER — OFFICE VISIT (OUTPATIENT)
Dept: FAMILY MEDICINE CLINIC | Facility: CLINIC | Age: 34
End: 2024-12-09
Payer: OTHER GOVERNMENT

## 2024-12-09 VITALS
BODY MASS INDEX: 38.05 KG/M2 | HEART RATE: 104 BPM | DIASTOLIC BLOOD PRESSURE: 80 MMHG | TEMPERATURE: 96.5 F | OXYGEN SATURATION: 98 % | SYSTOLIC BLOOD PRESSURE: 110 MMHG | WEIGHT: 228.4 LBS | RESPIRATION RATE: 18 BRPM | HEIGHT: 65 IN

## 2024-12-09 DIAGNOSIS — E27.9 ADRENAL NODULE: Primary | ICD-10-CM

## 2024-12-09 DIAGNOSIS — F90.2 ATTENTION DEFICIT HYPERACTIVITY DISORDER, COMBINED TYPE: ICD-10-CM

## 2024-12-09 DIAGNOSIS — F32.A ANXIETY AND DEPRESSION: ICD-10-CM

## 2024-12-09 DIAGNOSIS — Z00.00 PREVENTATIVE HEALTH CARE: Primary | ICD-10-CM

## 2024-12-09 DIAGNOSIS — F41.9 ANXIETY AND DEPRESSION: ICD-10-CM

## 2024-12-09 PROCEDURE — 99214 OFFICE O/P EST MOD 30 MIN: CPT | Performed by: NURSE PRACTITIONER

## 2024-12-09 RX ORDER — BUPROPION HYDROCHLORIDE 300 MG/1
300 TABLET ORAL DAILY
Qty: 90 TABLET | Refills: 0 | Status: SHIPPED | OUTPATIENT
Start: 2024-12-09

## 2024-12-09 RX ORDER — ATOMOXETINE 40 MG/1
40 CAPSULE ORAL DAILY
Qty: 90 CAPSULE | Refills: 0 | Status: SHIPPED | OUTPATIENT
Start: 2024-12-09

## 2024-12-09 NOTE — PROGRESS NOTES
"Chief Complaint  Primary Care Follow-Up    Subjective        Stephenie Bridges presents to [facility]  History of Present Illness    Patient presents today for emergency room follow-up on December 4, 2024.  Tested positive for rhinovirus.  CT of abdomen completed for right lower quadrant pain with abnormal finding.  Recently with respiratory virus back to back after visit to [place]; attended international oil spill conference.  Still having some congestion, overall feeling much better.  No further abdominal pain.    Anxiety/depression/ADD: Status is fluctuating.  Stopped taking wellbutrin 300 mg XL daily for approx 2 months. Boyfriend noticed depressed mood and anxiety symptoms returning, so restarted medication and feeling better again overall. Trialed Strattera 40 mg daily; reported significantly improved focus and attention.  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  There has been associated weight gain.  Negative for any suicidal or homicidal ideations.        Objective   Vital Signs:  /80 (BP Location: Right arm, Patient Position: Sitting, Cuff Size: Large Adult)   Pulse 104   Temp 96.5 °F (35.8 °C) (Temporal)   Resp 18   Ht 165.1 cm (65\")   Wt 104 kg (228 lb 6.4 oz)   SpO2 98%   BMI 38.01 kg/m²   Estimated body mass index is 38.01 kg/m² as calculated from the following:    Height as of this encounter: 165.1 cm (65\").    Weight as of this encounter: 104 kg (228 lb 6.4 oz).            Physical Exam  Constitutional:       General: She is not in acute distress.     Appearance: She is obese.   Cardiovascular:      Rate and Rhythm: Normal rate and regular rhythm.      Heart sounds: Normal heart sounds, S1 normal and S2 normal.   Pulmonary:      Effort: Pulmonary effort is normal.      Breath sounds: Normal breath sounds and air entry.   Abdominal:      General: Bowel sounds are normal.      Palpations: Abdomen is soft.      Tenderness: " There is no abdominal tenderness.   Skin:     General: Skin is warm and dry.   Neurological:      Mental Status: She is alert and oriented to person, place, and time.      Gait: Gait is intact.   Psychiatric:         Attention and Perception: Attention and perception normal.         Mood and Affect: Mood and affect normal.         Speech: Speech normal.         Behavior: Behavior normal. Behavior is cooperative.         Thought Content: Thought content normal. Thought content does not include homicidal or suicidal ideation.         Judgment: Judgment normal.        Result Review :    CMP          12/29/2023    10:23 12/4/2024    12:52   CMP   Glucose 86  125    BUN 10  9    Creatinine 0.73  0.68    EGFR 110.1  115.9    Sodium 139  137    Potassium 4.1  3.7    Chloride 105  103    Calcium 9.2  9.7    Total Protein 7.8  7.7    Albumin 4.7  4.5    Globulin 3.1  3.2    Total Bilirubin 0.8  0.4    Alkaline Phosphatase 60  74    AST (SGOT) 22  23    ALT (SGPT) 21  17    Albumin/Globulin Ratio 1.5  1.4    BUN/Creatinine Ratio 13.7  13.2    Anion Gap 12.2  11.0      CBC          12/29/2023    10:23 12/4/2024    12:52   CBC   WBC 7.51  10.90    RBC 4.50  4.50    Hemoglobin 13.9  13.2    Hematocrit 40.5  39.9    MCV 90.0  88.7    MCH 30.9  29.3    MCHC 34.3  33.1    RDW 12.4  12.8    Platelets 326  270      CBC w/diff          12/29/2023    10:23 12/4/2024    12:52   CBC w/Diff   WBC 7.51  10.90    RBC 4.50  4.50    Hemoglobin 13.9  13.2    Hematocrit 40.5  39.9    MCV 90.0  88.7    MCH 30.9  29.3    MCHC 34.3  33.1    RDW 12.4  12.8    Platelets 326  270    Neutrophil Rel % 52.0  70.6    Immature Granulocyte Rel % 0.3  0.2    Lymphocyte Rel % 38.6  21.9    Monocyte Rel % 6.9  6.1    Eosinophil Rel % 1.5  0.6    Basophil Rel % 0.7  0.6      Lipid Panel          12/29/2023    10:23   Lipid Panel   Total Cholesterol 170    Triglycerides 58    HDL Cholesterol 60    VLDL Cholesterol 11    LDL Cholesterol  99    LDL/HDL Ratio 1.64       TSH          12/29/2023    10:23   TSH   TSH 1.170      BMP          12/29/2023    10:23 12/4/2024    12:52   BMP   BUN 10  9    Creatinine 0.73  0.68    Sodium 139  137    Potassium 4.1  3.7    Chloride 105  103    CO2 21.8  23.0    Calcium 9.2  9.7      Most Recent A1C          12/29/2023    10:23   HGBA1C Most Recent   Hemoglobin A1C 5.00        UA          12/29/2023    10:23 12/4/2024    11:15   Urinalysis   Squamous Epithelial Cells, UA 13-20     Specific Gravity, UA >=1.030     Ketones, UA Trace  Negative    Blood, UA Negative     Leukocytes, UA Negative  Negative    Nitrite, UA Negative     RBC, UA None Seen     WBC, UA 0-2     Bacteria, UA Trace                CT ABDOMEN PELVIS W CONTRAST     Date of Exam: 12/4/2024 1:37 PM EST     Indication: RLQ pain; neg preg test today at [facility] [place].     Comparison: None available.     Technique: Axial CT images were obtained of the abdomen and pelvis following the uneventful intravenous administration of iodinated contrast. Sagittal and coronal reconstructions were performed.  Automated exposure control and iterative reconstruction   methods were used.           Findings:  Heart size normal. No pericardial effusion. No signs of active infection in the lower lungs.     Normal size and contour of liver and spleen. No suspicious liver lesion. Tiny low-density lesion segment 8 image 26 too small to accurately characterize most likely benign cyst or hemangioma. Portal vasculature patent. No evidence of active pancreatitis,   suspicious mass or drainable collection. Spleen is homogeneous. Slight asymmetric nodular thickening left adrenal gland versus low-density 1.3 cm left adrenal nodule.     Symmetric renal size, contour and enhancement. Punctate nonobstructing right midpole calculus. No hydronephrosis. No solid-appearing renal mass. Urinary bladder unremarkable. Retroverted uterus with IUD in place. Unremarkable appearance of the ovaries.     Expected configuration stomach  and duodenum. No bowel obstruction or active inflammation. Normal appendix. Normal caliber abdominal aorta. No suspicious adenopathy. No ascites, free air or drainable fluid collection.     Small fat-containing umbilical hernia. Minimal degenerative changes in the lumbar spine without acute displaced fracture or aggressive lesion.     IMPRESSION:  Impression:  1. No acute CT findings. Normal appendix.  2. Punctate nonobstructing right renal calculus. No hydronephrosis.  3. Incidental low-density nodular thickening versus discrete 1.3 cm left adrenal nodule favoring benign adenoma. In the absence of any malignant history this can be reassessed by 1 year follow-up multiphase adrenal mass protocol CT or MRI to ensure   stability.        Assessment and Plan   Diagnoses and all orders for this visit:    1. Adrenal nodule (Primary)  -     Ambulatory Referral to Endocrinology    2. Anxiety and depression  -     buPROPion XL (WELLBUTRIN XL) 300 MG 24 hr tablet; Take 1 tablet by mouth Daily.  Dispense: 90 tablet; Refill: 0    3. Attention deficit hyperactivity disorder, combined type  -     atomoxetine (STRATTERA) 40 MG capsule; Take 1 capsule by mouth Daily.  Dispense: 90 capsule; Refill: 0    4. Body mass index (BMI) of 38.0 to 38.9 in adult  Comments:  Encouraged healthy lifestyle/dietary choices, routine exercise of at least 150 minutes/week.             Follow Up   Return in about 3 months (around 3/9/2025) for Annual physical.  Patient was given instructions and counseling regarding her condition or for health maintenance advice. Please see specific information pulled into the AVS if appropriate.

## 2024-12-09 NOTE — PATIENT INSTRUCTIONS
Reminder: Schedule Pap smear/new visit with Mary White on [facility] In [place].

## 2025-01-13 ENCOUNTER — TELEMEDICINE (OUTPATIENT)
Dept: FAMILY MEDICINE CLINIC | Facility: TELEHEALTH | Age: 35
End: 2025-01-13
Payer: OTHER GOVERNMENT

## 2025-01-13 DIAGNOSIS — B96.89 BACTERIAL SINUSITIS: Primary | ICD-10-CM

## 2025-01-13 DIAGNOSIS — J32.9 BACTERIAL SINUSITIS: Primary | ICD-10-CM

## 2025-01-13 DIAGNOSIS — J20.9 ACUTE BRONCHITIS, UNSPECIFIED ORGANISM: ICD-10-CM

## 2025-01-13 PROCEDURE — 99213 OFFICE O/P EST LOW 20 MIN: CPT | Performed by: NURSE PRACTITIONER

## 2025-01-13 RX ORDER — DOXYCYCLINE 100 MG/1
100 CAPSULE ORAL 2 TIMES DAILY
Qty: 14 CAPSULE | Refills: 0 | Status: SHIPPED | OUTPATIENT
Start: 2025-01-13 | End: 2025-01-20

## 2025-01-13 RX ORDER — ALBUTEROL SULFATE 90 UG/1
2 INHALANT RESPIRATORY (INHALATION) EVERY 4 HOURS PRN
Qty: 18 G | Refills: 0 | Status: SHIPPED | OUTPATIENT
Start: 2025-01-13 | End: 2025-02-12

## 2025-01-13 RX ORDER — PREDNISONE 20 MG/1
20 TABLET ORAL 2 TIMES DAILY
Qty: 14 TABLET | Refills: 0 | Status: SHIPPED | OUTPATIENT
Start: 2025-01-13 | End: 2025-01-20

## 2025-01-13 RX ORDER — BENZONATATE 100 MG/1
100 CAPSULE ORAL 3 TIMES DAILY PRN
Qty: 30 CAPSULE | Refills: 0 | Status: SHIPPED | OUTPATIENT
Start: 2025-01-13

## 2025-01-13 NOTE — PROGRESS NOTES
No chief complaint on file.      Video Visit Reason:   Free Text Description: Worried about having a hard time breathing.  Fatuma Bridges is a 34 y.o. female.     History of Present Illness  The patient presents via virtual visit for evaluation of breathing difficulties.    She reports experiencing significant chest congestion, which has led to difficulty in breathing, particularly when ascending or descending stairs in her two-story residence. She describes a sensation of nasal obstruction and chest tightness. She has been producing thick, yellow nasal discharge. She has no known history of asthma but acknowledges the presence of wheezing. She also reports a sensation of fluid in her lungs when lying in bed at night. She has a history of seasonal allergies during her time in [place], but these have not manifested since her relocation to [place]. She is uncertain whether her current symptoms are indicative of bronchitis or a sinus infection. She sought medical attention at an urgent care facility approximately a month ago due to illness, which improved after a week but subsequently recurred and has persisted for the past 3 weeks. She has a history of frequent bronchitis episodes. She has been managing her symptoms with TheraFlu PM and Yoselin-Colwell Plus during the day, and cough drops. She was previously prescribed Flonase, which provided some relief, but she has not used it recently.        ALLERGIES  The patient has no known allergies.    MEDICATIONS  Current: Flonase, TheraFlu PM, Yoselin-Colwell Plus, cough drops        The following portions of the patient's history were reviewed and updated as appropriate: allergies, current medications, past medical history, and problem list.      Past Medical History:   Diagnosis Date    ADHD (attention deficit hyperactivity disorder) 2018    Health examination of defined subpopulation 12/29/2023    Problems in relationship with spouse or partner 03/29/2018      Social History     Socioeconomic History    Marital status: Single   Tobacco Use    Smoking status: Former     Current packs/day: 0.50     Average packs/day: 0.5 packs/day for 22.0 years (11.0 ttl pk-yrs)     Types: Cigarettes     Start date: 2003     Passive exposure: Past    Smokeless tobacco: Never   Vaping Use    Vaping status: Every Day    Start date: 10/1/2023    Substances: Nicotine    Devices: Disposable   Substance and Sexual Activity    Alcohol use: Not Currently     Alcohol/week: 2.0 standard drinks of alcohol     Types: 2 Cans of beer per week     Comment: When i am not taking my meds I enjoy a special occasion    Drug use: Never    Sexual activity: Yes     Partners: Female, Male     Birth control/protection: Condom, I.U.D., Partner of same sex     medication documentation: reviewed and updated with patient and   Current Outpatient Medications:     albuterol sulfate  (90 Base) MCG/ACT inhaler, Inhale 2 puffs Every 4 (Four) Hours As Needed for Wheezing or Shortness of Air for up to 30 days., Disp: 18 g, Rfl: 0    atomoxetine (STRATTERA) 40 MG capsule, Take 1 capsule by mouth Daily., Disp: 90 capsule, Rfl: 0    benzonatate (Tessalon Perles) 100 MG capsule, Take 1 capsule by mouth 3 (Three) Times a Day As Needed for Cough., Disp: 30 capsule, Rfl: 0    buPROPion XL (WELLBUTRIN XL) 300 MG 24 hr tablet, Take 1 tablet by mouth Daily., Disp: 90 tablet, Rfl: 0    doxycycline (VIBRAMYCIN) 100 MG capsule, Take 1 capsule by mouth 2 (Two) Times a Day for 7 days., Disp: 14 capsule, Rfl: 0    EPINEPHrine (EpiPen 2-Kamaljit) 0.3 MG/0.3ML solution auto-injector injection, INJECT 0.3 MILLILITER (0.3 MG) BY INTRAMUSCULAR ROUTE ONCE AS NEEDED FOR ANAPHYLAXIS, Disp: , Rfl:     predniSONE (DELTASONE) 20 MG tablet, Take 1 tablet by mouth 2 (Two) Times a Day for 7 days., Disp: 14 tablet, Rfl: 0    Review of Systems  See HPI    Objective   Physical Exam  Constitutional:       General: She is not in acute distress.      Appearance: She is ill-appearing.   HENT:      Nose: Congestion present.      Mouth/Throat:      Pharynx: Posterior oropharyngeal erythema present.   Pulmonary:      Effort: Pulmonary effort is normal.      Comments: Frequent cough and hoarseness  Neurological:      Mental Status: She is alert.   Psychiatric:         Mood and Affect: Mood normal.         Assessment & Plan   Diagnoses and all orders for this visit:    1. Bacterial sinusitis (Primary)  -     doxycycline (VIBRAMYCIN) 100 MG capsule; Take 1 capsule by mouth 2 (Two) Times a Day for 7 days.  Dispense: 14 capsule; Refill: 0    2. Acute bronchitis, unspecified organism  -     albuterol sulfate  (90 Base) MCG/ACT inhaler; Inhale 2 puffs Every 4 (Four) Hours As Needed for Wheezing or Shortness of Air for up to 30 days.  Dispense: 18 g; Refill: 0  -     predniSONE (DELTASONE) 20 MG tablet; Take 1 tablet by mouth 2 (Two) Times a Day for 7 days.  Dispense: 14 tablet; Refill: 0  -     benzonatate (Tessalon Perles) 100 MG capsule; Take 1 capsule by mouth 3 (Three) Times a Day As Needed for Cough.  Dispense: 30 capsule; Refill: 0      If symptoms persist, she should consult her primary care physician for further evaluation, including the possibility of developing asthma and the need for long-term medication.             Follow Up:  If your symptoms are not resolving by the completion of your treatment or are worsening, see your primary care provider for follow up. If you don't have a primary care provider, you may go to any Urgent Care for re-evaluation. If you develop any life threatening symptoms, go to the nearest Emergency Department immediately or call EMS.       Patient or patient representative verbalized consent for the use of Ambient Listening during the visit with  ZITA Castillo for chart documentation. 1/13/2025  12:39 EST        The use of  Video Visit was utilized during this visit, using both [employer] and [employer]. The use of a video  visit has been reviewed with the patient and verbal informed consent has been obtained. No technical difficulties occurred during the visit.    is located at [address] [facility] [place] [facility]  Provider is located at [place], [place]

## 2025-01-13 NOTE — PATIENT INSTRUCTIONS
Sinus Infection, Adult  A sinus infection is soreness and swelling (inflammation) of your sinuses. Sinuses are hollow spaces in the bones around your face. They are located:  Around your eyes.  In the middle of your forehead.  Behind your nose.  In your cheekbones.  Your sinuses and nasal passages are lined with a fluid called mucus. Mucus drains out of your sinuses. Swelling can trap mucus in your sinuses. This lets germs (bacteria, virus, or fungus) grow, which leads to infection. Most of the time, this condition is caused by a virus.  What are the causes?  Allergies.  Asthma.  Germs.  Things that block your nose or sinuses.  Growths in the nose (nasal polyps).  Chemicals or irritants in the air.  A fungus. This is rare.  What increases the risk?  Having a weak body defense system (immune system).  Doing a lot of swimming or diving.  Using nasal sprays too much.  Smoking.  What are the signs or symptoms?  The main symptoms of this condition are pain and a feeling of pressure around the sinuses. Other symptoms include:  Stuffy nose (congestion). This may make it hard to breathe through your nose.  Runny nose (drainage).  Soreness, swelling, and warmth in the sinuses.  A cough that may get worse at night.  Being unable to smell and taste.  Mucus that collects in the throat or the back of the nose (postnasal drip). This may cause a sore throat or bad breath.  Being very tired (fatigued).  A fever.  How is this diagnosed?  Your symptoms.  Your medical history.  A physical exam.  Tests to find out if your condition is short-term (acute) or long-term (chronic). Your doctor may:  Check your nose for growths (polyps).  Check your sinuses using a tool that has a light on one end (endoscope).  Check for allergies or germs.  Do imaging tests, such as an MRI or CT scan.  How is this treated?  Treatment for this condition depends on the cause and whether it is short-term or long-term.  If caused by a virus, your symptoms  should go away on their own within 10 days. You may be given medicines to relieve symptoms. They include:  Medicines that shrink swollen tissue in the nose.  A spray that treats swelling of the nostrils.  Rinses that help get rid of thick mucus in your nose (nasal saline washes).  Medicines that treat allergies (antihistamines).  Over-the-counter pain relievers.  If caused by bacteria, your doctor may wait to see if you will get better without treatment. You may be given antibiotic medicine if you have:  A very bad infection.  A weak body defense system.  If caused by growths in the nose, surgery may be needed.  Follow these instructions at home:  Medicines  Take, use, or apply over-the-counter and prescription medicines only as told by your doctor. These may include nasal sprays.  If you were prescribed an antibiotic medicine, take it as told by your doctor. Do not stop taking it even if you start to feel better.  Hydrate and humidify    Drink enough water to keep your pee (urine) pale yellow.  Use a cool mist humidifier to keep the humidity level in your home above 50%.  Breathe in steam for 10-15 minutes, 3-4 times a day, or as told by your doctor. You can do this in the bathroom while a hot shower is running.  Try not to spend time in cool or dry air.  Rest  Rest as much as you can.  Sleep with your head raised (elevated).  Make sure you get enough sleep each night.  General instructions    Put a warm, moist washcloth on your face 3-4 times a day, or as often as told by your doctor.  Use nasal saline washes as often as told by your doctor.  Wash your hands often with soap and water. If you cannot use soap and water, use hand .  Do not smoke. Avoid being around people who are smoking (secondhand smoke).  Keep all follow-up visits.  Contact a doctor if:  You have a fever.  Your symptoms get worse.  Your symptoms do not get better within 10 days.  Get help right away if:  You have a very bad headache.  You  cannot stop vomiting.  You have very bad pain or swelling around your face or eyes.  You have trouble seeing.  You feel confused.  Your neck is stiff.  You have trouble breathing.  These symptoms may be an emergency. Get help right away. Call 911.  Do not wait to see if the symptoms will go away.  Do not drive yourself to the hospital.  Summary  A sinus infection is swelling of your sinuses. Sinuses are hollow spaces in the bones around your face.  This condition is caused by tissues in your nose that become inflamed or swollen. This traps germs. These can lead to infection.  If you were prescribed an antibiotic medicine, take it as told by your doctor. Do not stop taking it even if you start to feel better.  Keep all follow-up visits.  This information is not intended to replace advice given to you by your health care provider. Make sure you discuss any questions you have with your health care provider.  Document Revised: 11/22/2022 Document Reviewed: 11/22/2022  [employer] Patient Education © 2024 [employer]Acute Bronchitis, Adult    Acute bronchitis is sudden inflammation of the main airways (bronchi) that come off the windpipe (trachea) in the lungs. The swelling causes the airways to get smaller and make more mucus than normal. This can make it hard to breathe and can cause coughing or noisy breathing (wheezing).  Acute bronchitis may last several weeks. The cough may last longer. Allergies, asthma, and exposure to smoke may make the condition worse.  What are the causes?  This condition can be caused by germs and by substances that irritate the lungs, including:  Cold and flu viruses. The most common cause of this condition is the virus that causes the common cold.  Bacteria. This is less common.  Breathing in substances that irritate the lungs, including:  Smoke from cigarettes and other forms of tobacco.  Dust and pollen.  Fumes from household cleaning products, gases, or burned fuel.  Indoor or outdoor air  pollution.  What increases the risk?  The following factors may make you more likely to develop this condition:  A weak body's defense system, also called the immune system.  A condition that affects your lungs and breathing, such as asthma.  What are the signs or symptoms?  Common symptoms of this condition include:  Coughing. This may bring up clear, yellow, or green mucus from your lungs (sputum).  Wheezing.  Runny or stuffy nose.  Having too much mucus in your lungs (chest congestion).  Shortness of breath.  Aches and pains, including sore throat or chest.  How is this diagnosed?  This condition is usually diagnosed based on:  Your symptoms and medical history.  A physical exam.  You may also have other tests, including tests to rule out other conditions, such as pneumonia. These tests include:  A test of lung function.  Test of a mucus sample to look for the presence of bacteria.  Tests to check the oxygen level in your blood.  Blood tests.  Chest X-ray.  How is this treated?  Most cases of acute bronchitis clear up over time without treatment. Your health care provider may recommend:  Drinking more fluids to help thin your mucus so it is easier to cough up.  Taking inhaled medicine (inhaler) to improve air flow in and out of your lungs.  Using a vaporizer or a humidifier. These are machines that add water to the air to help you breathe better.  Taking a medicine that thins mucus and clears congestion (expectorant).  Taking a medicine that prevents or stops coughing (cough suppressant).  It is not common to take an antibiotic medicine for this condition.  Follow these instructions at home:    Take over-the-counter and prescription medicines only as told by your health care provider.  Use an inhaler, vaporizer, or humidifier as told by your health care provider.  Take two teaspoons (10 mL) of honey at bedtime to lessen coughing at night.  Drink enough fluid to keep your urine pale yellow.  Do not use any  products that contain nicotine or tobacco. These products include cigarettes, chewing tobacco, and vaping devices, such as e-cigarettes. If you need help quitting, ask your health care provider.  Get plenty of rest.  Return to your normal activities as told by your health care provider. Ask your health care provider what activities are safe for you.  Keep all follow-up visits. This is important.  How is this prevented?  To lower your risk of getting this condition again:  Wash your hands often with soap and water for at least 20 seconds. If soap and water are not available, use hand .  Avoid contact with people who have cold symptoms.  Try not to touch your mouth, nose, or eyes with your hands.  Avoid breathing in smoke or chemical fumes. Breathing smoke or chemical fumes will make your condition worse.  Get the flu shot every year.  Contact a health care provider if:  Your symptoms do not improve after 2 weeks.  You have trouble coughing up the mucus.  Your cough keeps you awake at night.  You have a fever.  Get help right away if you:  Cough up blood.  Feel pain in your chest.  Have severe shortness of breath.  Faint or keep feeling like you are going to faint.  Have a severe headache.  Have a fever or chills that get worse.  These symptoms may represent a serious problem that is an emergency. Do not wait to see if the symptoms will go away. Get medical help right away. Call your local emergency services (911 in the [place] Do not drive yourself to the hospital.  Summary  Acute bronchitis is inflammation of the main airways (bronchi) that come off the windpipe (trachea) in the lungs. The swelling causes the airways to get smaller and make more mucus than normal.  Drinking more fluids can help thin your mucus so it is easier to cough up.  Take over-the-counter and prescription medicines only as told by your health care provider.  Do not use any products that contain nicotine or tobacco. These products  include cigarettes, chewing tobacco, and vaping devices, such as e-cigarettes. If you need help quitting, ask your health care provider.  Contact a health care provider if your symptoms do not improve after 2 weeks.  This information is not intended to replace advice given to you by your health care provider. Make sure you discuss any questions you have with your health care provider.  Document Revised: 03/30/2023 Document Reviewed: 04/20/2022  [employer] Patient Education © 2024 [employer]

## 2025-03-03 ENCOUNTER — LAB (OUTPATIENT)
Dept: FAMILY MEDICINE CLINIC | Facility: CLINIC | Age: 35
End: 2025-03-03
Payer: OTHER GOVERNMENT

## 2025-03-03 DIAGNOSIS — Z00.00 PREVENTATIVE HEALTH CARE: ICD-10-CM

## 2025-03-03 LAB
ALBUMIN SERPL-MCNC: 4 G/DL (ref 3.5–5.2)
ALBUMIN/GLOB SERPL: 1.4 G/DL
ALP SERPL-CCNC: 76 U/L (ref 39–117)
ALT SERPL W P-5'-P-CCNC: 19 U/L (ref 1–33)
ANION GAP SERPL CALCULATED.3IONS-SCNC: 11 MMOL/L (ref 5–15)
AST SERPL-CCNC: 20 U/L (ref 1–32)
BASOPHILS # BLD AUTO: 0.06 10*3/MM3 (ref 0–0.2)
BASOPHILS NFR BLD AUTO: 0.8 % (ref 0–1.5)
BILIRUB SERPL-MCNC: 0.4 MG/DL (ref 0–1.2)
BILIRUB UR QL STRIP: NEGATIVE
BUN SERPL-MCNC: 11 MG/DL (ref 6–20)
BUN/CREAT SERPL: 14.7 (ref 7–25)
CALCIUM SPEC-SCNC: 9.1 MG/DL (ref 8.6–10.5)
CHLORIDE SERPL-SCNC: 106 MMOL/L (ref 98–107)
CHOLEST SERPL-MCNC: 167 MG/DL (ref 0–200)
CLARITY UR: CLEAR
CO2 SERPL-SCNC: 23 MMOL/L (ref 22–29)
COLOR UR: YELLOW
CREAT SERPL-MCNC: 0.75 MG/DL (ref 0.57–1)
DEPRECATED RDW RBC AUTO: 42 FL (ref 37–54)
EGFRCR SERPLBLD CKD-EPI 2021: 106 ML/MIN/1.73
EOSINOPHIL # BLD AUTO: 0.08 10*3/MM3 (ref 0–0.4)
EOSINOPHIL NFR BLD AUTO: 1 % (ref 0.3–6.2)
ERYTHROCYTE [DISTWIDTH] IN BLOOD BY AUTOMATED COUNT: 12.9 % (ref 12.3–15.4)
GLOBULIN UR ELPH-MCNC: 2.9 GM/DL
GLUCOSE SERPL-MCNC: 91 MG/DL (ref 65–99)
GLUCOSE UR STRIP-MCNC: NEGATIVE MG/DL
HBA1C MFR BLD: 5.1 % (ref 4.8–5.6)
HCT VFR BLD AUTO: 38.9 % (ref 34–46.6)
HDLC SERPL-MCNC: 55 MG/DL (ref 40–60)
HGB BLD-MCNC: 13 G/DL (ref 12–15.9)
HGB UR QL STRIP.AUTO: NEGATIVE
HOLD SPECIMEN: NORMAL
IMM GRANULOCYTES # BLD AUTO: 0.04 10*3/MM3 (ref 0–0.05)
IMM GRANULOCYTES NFR BLD AUTO: 0.5 % (ref 0–0.5)
KETONES UR QL STRIP: NEGATIVE
LDLC SERPL CALC-MCNC: 100 MG/DL (ref 0–100)
LDLC/HDLC SERPL: 1.82 {RATIO}
LEUKOCYTE ESTERASE UR QL STRIP.AUTO: NEGATIVE
LYMPHOCYTES # BLD AUTO: 2.62 10*3/MM3 (ref 0.7–3.1)
LYMPHOCYTES NFR BLD AUTO: 34.3 % (ref 19.6–45.3)
MCH RBC QN AUTO: 29.7 PG (ref 26.6–33)
MCHC RBC AUTO-ENTMCNC: 33.4 G/DL (ref 31.5–35.7)
MCV RBC AUTO: 88.8 FL (ref 79–97)
MONOCYTES # BLD AUTO: 0.6 10*3/MM3 (ref 0.1–0.9)
MONOCYTES NFR BLD AUTO: 7.9 % (ref 5–12)
NEUTROPHILS NFR BLD AUTO: 4.23 10*3/MM3 (ref 1.7–7)
NEUTROPHILS NFR BLD AUTO: 55.5 % (ref 42.7–76)
NITRITE UR QL STRIP: NEGATIVE
NRBC BLD AUTO-RTO: 0 /100 WBC (ref 0–0.2)
PH UR STRIP.AUTO: 7 [PH] (ref 5–8)
PLATELET # BLD AUTO: 323 10*3/MM3 (ref 140–450)
PMV BLD AUTO: 10.8 FL (ref 6–12)
POTASSIUM SERPL-SCNC: 4.2 MMOL/L (ref 3.5–5.2)
PROT SERPL-MCNC: 6.9 G/DL (ref 6–8.5)
PROT UR QL STRIP: NEGATIVE
RBC # BLD AUTO: 4.38 10*6/MM3 (ref 3.77–5.28)
SODIUM SERPL-SCNC: 140 MMOL/L (ref 136–145)
SP GR UR STRIP: 1.01 (ref 1–1.03)
TRIGL SERPL-MCNC: 59 MG/DL (ref 0–150)
TSH SERPL DL<=0.05 MIU/L-ACNC: 2.31 UIU/ML (ref 0.27–4.2)
UROBILINOGEN UR QL STRIP: NORMAL
VLDLC SERPL-MCNC: 12 MG/DL (ref 5–40)
WBC NRBC COR # BLD AUTO: 7.63 10*3/MM3 (ref 3.4–10.8)

## 2025-03-03 PROCEDURE — 81003 URINALYSIS AUTO W/O SCOPE: CPT | Performed by: NURSE PRACTITIONER

## 2025-03-03 PROCEDURE — 80061 LIPID PANEL: CPT | Performed by: NURSE PRACTITIONER

## 2025-03-03 PROCEDURE — 80053 COMPREHEN METABOLIC PANEL: CPT | Performed by: NURSE PRACTITIONER

## 2025-03-03 PROCEDURE — 85025 COMPLETE CBC W/AUTO DIFF WBC: CPT | Performed by: NURSE PRACTITIONER

## 2025-03-03 PROCEDURE — 84443 ASSAY THYROID STIM HORMONE: CPT | Performed by: NURSE PRACTITIONER

## 2025-03-03 PROCEDURE — 83036 HEMOGLOBIN GLYCOSYLATED A1C: CPT | Performed by: NURSE PRACTITIONER

## 2025-03-09 DIAGNOSIS — F90.2 ATTENTION DEFICIT HYPERACTIVITY DISORDER, COMBINED TYPE: ICD-10-CM

## 2025-03-09 RX ORDER — ATOMOXETINE 40 MG/1
40 CAPSULE ORAL DAILY
Qty: 90 CAPSULE | Refills: 0 | OUTPATIENT
Start: 2025-03-09

## 2025-03-10 ENCOUNTER — OFFICE VISIT (OUTPATIENT)
Dept: FAMILY MEDICINE CLINIC | Facility: CLINIC | Age: 35
End: 2025-03-10
Payer: OTHER GOVERNMENT

## 2025-03-10 ENCOUNTER — TELEPHONE (OUTPATIENT)
Dept: FAMILY MEDICINE CLINIC | Facility: CLINIC | Age: 35
End: 2025-03-10

## 2025-03-10 VITALS
SYSTOLIC BLOOD PRESSURE: 122 MMHG | HEIGHT: 65 IN | BODY MASS INDEX: 40.04 KG/M2 | DIASTOLIC BLOOD PRESSURE: 82 MMHG | TEMPERATURE: 97.6 F | HEART RATE: 72 BPM | OXYGEN SATURATION: 97 % | WEIGHT: 240.3 LBS | RESPIRATION RATE: 18 BRPM

## 2025-03-10 DIAGNOSIS — E27.9 ADRENAL NODULE: ICD-10-CM

## 2025-03-10 DIAGNOSIS — Z00.00 ANNUAL PHYSICAL EXAM: Primary | ICD-10-CM

## 2025-03-10 DIAGNOSIS — L73.2 HIDRADENITIS SUPPURATIVA: ICD-10-CM

## 2025-03-10 DIAGNOSIS — Z71.85 VACCINE COUNSELING: ICD-10-CM

## 2025-03-10 DIAGNOSIS — E66.09 CLASS 2 OBESITY DUE TO EXCESS CALORIES WITHOUT SERIOUS COMORBIDITY WITH BODY MASS INDEX (BMI) OF 39.0 TO 39.9 IN ADULT: ICD-10-CM

## 2025-03-10 DIAGNOSIS — F41.9 ANXIETY AND DEPRESSION: ICD-10-CM

## 2025-03-10 DIAGNOSIS — F32.A ANXIETY AND DEPRESSION: ICD-10-CM

## 2025-03-10 DIAGNOSIS — F90.2 ATTENTION DEFICIT HYPERACTIVITY DISORDER, COMBINED TYPE: ICD-10-CM

## 2025-03-10 DIAGNOSIS — E66.812 CLASS 2 OBESITY DUE TO EXCESS CALORIES WITHOUT SERIOUS COMORBIDITY WITH BODY MASS INDEX (BMI) OF 39.0 TO 39.9 IN ADULT: ICD-10-CM

## 2025-03-10 PROCEDURE — 99395 PREV VISIT EST AGE 18-39: CPT | Performed by: NURSE PRACTITIONER

## 2025-03-10 RX ORDER — DOXYCYCLINE 100 MG/1
100 CAPSULE ORAL 2 TIMES DAILY
Qty: 60 CAPSULE | Refills: 0 | Status: SHIPPED | OUTPATIENT
Start: 2025-03-10

## 2025-03-10 RX ORDER — FLUCONAZOLE 150 MG/1
150 TABLET ORAL ONCE
Qty: 2 TABLET | Refills: 0 | Status: SHIPPED | OUTPATIENT
Start: 2025-03-10 | End: 2025-03-10

## 2025-03-10 NOTE — PROGRESS NOTES
Chief Complaint  Annual Exam    Fatuma Bridges presents to [facility]  History of Present Illness    Patient presents today for annual physical exam and follow up on anxiety/depression and ADD.     There has been no follow-through with gynecology for Pap smear and IUD.      Anxiety/depression/ADD: Status is stable.  Current medication includes bupropion 300 mg XL daily and Strattera 40 mg daily.  Reported anxiety and depressive symptoms improved overall.  Able to focus and complete tasks.  There has been associated weight gain.  Previously stopped Vyvanse for binge eating due to restriction at work/[employer].  Negative for any suicidal or homicidal ideations.      Review of systems:  Constitutional: Positive weight gain.  Desires routine exercise and weight loss.  Negative for fatigue, fever, chills, night sweats, and trouble sleeping    Skin: No follow through with dermatology for hidradenitis-scattered nodules currently forming with erythema.  Negative for itching, rash, growth/lesions, dryness, change in hair or nails, abnormal mole.     Eyes: Negative for visual disturbance and dry eyes    HEENT: Negative for hoarseness, nosebleeds, post nasal drainage, snoring, sneezing, vertigo, sore throat, dry mouth, dental/oral cavity problem, nasal discharge, sinus pain, earache, tinnitus, vertigo, and difficult hearing.     Respiratory: Negative for shortness of breath, apnea, cough, sputum, and wheezing     Breast: Negative for lumps or nipple discharge    Cardiovascular: Negative for chest pain, palpitations, dizziness, fainting, and edema    Gastrointestinal: Negative for nausea, vomiting, heartburn, change in appetite, swallowing problem, abdominal pain, constipation, diarrhea, black, blood, or mucous in stool, and change in stool caliber    Genitourinary: Negative for frequency, urgency, hesitancy, dysuria, incontinence, hematuria, hernia, vaginal discharge,  "vaginal dryness, itching, abnormal bleeding, dyspareunia, and pelvic pain     Musculoskeletal: Negative for loss of strength, restricted movement, joint pain, joint swelling, and muscle pain    Neurological: Negative for headache, dizziness, numbness, weakness, tingling/paresthesia, memory change/loss, loss of consciousness, speech disturbance, tremor, unsteady gait, and restless legs    Hematologic/Lymphatic/Immunologic: Negative for easy bleeding, easy bruising, lymph node enlargement, and recurrent infection     Endocrine:  Appt with endo April 2025 for adrenal nodule.   Negative for polyphagia, polydipsia, polyuria, cold intolerance, heat intolerance, weight change, and excessive sweating    Psychiatric: see HPI.         PHQ-9 Depression Screening  Little interest or pleasure in doing things? Not at all   Feeling down, depressed, or hopeless? Not at all   PHQ-2 Total Score 0   Trouble falling or staying asleep, or sleeping too much?     Feeling tired or having little energy?     Poor appetite or overeating?     Feeling bad about yourself - or that you are a failure or have let yourself or your family down?     Trouble concentrating on things, such as reading the newspaper or watching television?     Moving or speaking so slowly that other people could have noticed? Or the opposite - being so fidgety or restless that you have been moving around a lot more than usual?       Thoughts that you would be better off dead, or of hurting yourself in some way?     PHQ-9 Total Score     If you checked off any problems, how difficult have these problems made it for you to do your work, take care of things at home, or get along with other people? Not difficult at all                      Objective   Vital Signs:  /82 (BP Location: Right arm, Patient Position: Sitting, Cuff Size: Adult)   Pulse 72   Temp 97.6 °F (36.4 °C) (Temporal)   Resp 18   Ht 165.1 cm (65\")   Wt 109 kg (240 lb 4.8 oz)   SpO2 97%   BMI 39.99 " "kg/m²   Estimated body mass index is 39.99 kg/m² as calculated from the following:    Height as of this encounter: 165.1 cm (65\").    Weight as of this encounter: 109 kg (240 lb 4.8 oz).          Physical Exam  Vitals and nursing note reviewed.   Constitutional:       General: She is not in acute distress.     Appearance: Normal appearance. She is well-developed. She is obese.      Comments: Pleasant, converses appropriately     HENT:      Head: Normocephalic and atraumatic.      Right Ear: Hearing, tympanic membrane, ear canal and external ear normal.      Left Ear: Hearing, tympanic membrane, ear canal and external ear normal.      Nose: Nose normal.      Mouth/Throat:      Lips: Pink.      Mouth: Mucous membranes are moist.      Pharynx: Oropharynx is clear.   Eyes:      Conjunctiva/sclera: Conjunctivae normal.      Pupils: Pupils are equal, round, and reactive to light.   Neck:      Thyroid: No thyromegaly.   Cardiovascular:      Rate and Rhythm: Normal rate and regular rhythm.      Chest Wall: PMI is not displaced.      Pulses: Normal pulses.      Heart sounds: Normal heart sounds, S1 normal and S2 normal.   Pulmonary:      Effort: Pulmonary effort is normal.      Breath sounds: Normal breath sounds.   Abdominal:      General: Bowel sounds are normal.      Palpations: Abdomen is soft.      Tenderness: There is no abdominal tenderness.   Musculoskeletal:         General: Normal range of motion.      Cervical back: Normal range of motion and neck supple.      Right lower leg: No edema.      Left lower leg: No edema.      Comments: Bilateral upper and lower extremity strength normal.   Lymphadenopathy:      Cervical: No cervical adenopathy.      Upper Body:      Right upper body: No supraclavicular adenopathy.      Left upper body: No supraclavicular adenopathy.   Skin:     General: Skin is warm and dry.      Findings: Rash is nodular and papular.   Neurological:      Mental Status: She is alert and oriented to " person, place, and time.      Cranial Nerves: Cranial nerves 2-12 are intact.      Motor: Motor function is intact.      Gait: Gait is intact.   Psychiatric:         Attention and Perception: Attention normal.         Mood and Affect: Mood and affect normal.         Speech: Speech normal.         Behavior: Behavior normal.         Thought Content: Thought content normal. Thought content does not include homicidal or suicidal ideation.         Cognition and Memory: Memory normal.         Judgment: Judgment normal.      Comments: Dressed appropriately.         Result Review :    CMP          12/4/2024    12:52 3/3/2025    09:26   CMP   Glucose 125  91    BUN 9  11    Creatinine 0.68  0.75    EGFR 115.9  106.0    Sodium 137  140    Potassium 3.7  4.2    Chloride 103  106    Calcium 9.7  9.1    Total Protein 7.7  6.9    Albumin 4.5  4.0    Globulin 3.2  2.9    Total Bilirubin 0.4  0.4    Alkaline Phosphatase 74  76    AST (SGOT) 23  20    ALT (SGPT) 17  19    Albumin/Globulin Ratio 1.4  1.4    BUN/Creatinine Ratio 13.2  14.7    Anion Gap 11.0  11.0      CBC          12/4/2024    12:52 3/3/2025    09:26   CBC   WBC 10.90  7.63    RBC 4.50  4.38    Hemoglobin 13.2  13.0    Hematocrit 39.9  38.9    MCV 88.7  88.8    MCH 29.3  29.7    MCHC 33.1  33.4    RDW 12.8  12.9    Platelets 270  323      CBC w/diff          12/4/2024    12:52 3/3/2025    09:26   CBC w/Diff   WBC 10.90  7.63    RBC 4.50  4.38    Hemoglobin 13.2  13.0    Hematocrit 39.9  38.9    MCV 88.7  88.8    MCH 29.3  29.7    MCHC 33.1  33.4    RDW 12.8  12.9    Platelets 270  323    Neutrophil Rel % 70.6  55.5    Immature Granulocyte Rel % 0.2  0.5    Lymphocyte Rel % 21.9  34.3    Monocyte Rel % 6.1  7.9    Eosinophil Rel % 0.6  1.0    Basophil Rel % 0.6  0.8      Lipid Panel          3/3/2025    09:26   Lipid Panel   Total Cholesterol 167    Triglycerides 59    HDL Cholesterol 55    VLDL Cholesterol 12    LDL Cholesterol  100    LDL/HDL Ratio 1.82      TSH           3/3/2025    09:26   TSH   TSH 2.310      Most Recent A1C          3/3/2025    09:26   HGBA1C Most Recent   Hemoglobin A1C 5.10        UA          12/4/2024    11:15 3/3/2025    09:26   Urinalysis   Specific [place], UA  1.013    Ketones, UA Negative  Negative    Blood, UA  Negative    Leukocytes, UA Negative  Negative    Nitrite, UA  Negative                  Assessment and Plan   Diagnoses and all orders for this visit:    1. Annual physical exam (Primary)  Assessment & Plan:  Discussed injury prevention, diet and exercise, safe sexual practices, and screening for common diseases. Encouraged use of sunscreen and seatbelts. Discussed timing of breast cancer screening, cervical cancer screening, colon cancer screening, and review of skin for lesions. Avoidance of tobacco encouraged. Limitation or avoidance of alcohol encouraged. Recommend yearly eye and dental exams. Also discussed monitoring of blood pressure and lipids.         2. Anxiety and depression  Assessment & Plan:  Continue bupropion 300 mg extended release daily.  Follow-up in 3 months with update.      3. Attention deficit hyperactivity disorder, combined type  Assessment & Plan:  Psychological condition is stable.  Continue Strattera 40 mg daily.  Continue current treatment regimen.  Psychological condition  will be reassessed in 3 months.      4. Vaccine counseling    5. Hidradenitis suppurativa  -     Ambulatory Referral to Dermatology  -     doxycycline (VIBRAMYCIN) 100 MG capsule; Take 1 capsule by mouth 2 (Two) Times a Day.  Dispense: 60 capsule; Refill: 0    6. Adrenal nodule  Assessment & Plan:  Follow-up with endocrinology at scheduled visit.      7. Class 2 obesity due to excess calories without serious comorbidity with body mass index (BMI) of 39.0 to 39.9 in adult  Assessment & Plan:  Start Zepbound 2.5 mg injection weekly.  There is no known personal or family history of medullary thyroid cancer.   There is no known history of  pancreatitis.   There are no current plans for pregnancy.   Short term goal: Loss of at least 5 percent of total body weight at three to six months.   Diet/Nutritional counseling:  Discussed balanced low-calorie, high-protein low carbohydrate diet, Mediterranean diet. Discussed importance of dietary adherence.   Exercise: Discussed minimum of 150 minutes of exercise per week (such as brisk walking)  Behavior modification: Discussed modifying and monitoring food intake, increasing physical activity, and controlling environmental cues/stimuli that may trigger eating.   Follow-up in 3 months.    Orders:  -     Tirzepatide-Weight Management (ZEPBOUND) 2.5 MG/0.5ML solution auto-injector; Inject 0.5 mL under the skin into the appropriate area as directed 1 (One) Time Per Week.  Dispense: 2 mL; Refill: 2    Other orders  -     fluconazole (Diflucan) 150 MG tablet; Take 1 tablet by mouth 1 (One) Time for 1 dose. May repeat in 3 days if needed  Dispense: 2 tablet; Refill: 0    Resend referral to gynecology for screening Pap.          Follow Up   Return in about 3 months (around 6/10/2025).  Patient was given instructions and counseling regarding her condition or for health maintenance advice. Please see specific information pulled into the AVS if appropriate.

## 2025-03-11 ENCOUNTER — TELEPHONE (OUTPATIENT)
Dept: FAMILY MEDICINE CLINIC | Facility: CLINIC | Age: 35
End: 2025-03-11

## 2025-03-11 NOTE — ASSESSMENT & PLAN NOTE
Psychological condition is stable.  Continue Strattera 40 mg daily.  Continue current treatment regimen.  Psychological condition  will be reassessed in 3 months.

## 2025-03-11 NOTE — TELEPHONE ENCOUNTER
Caller: Stephenie Bridges    Relationship to patient: Self      Best call back number: [phone]     Provider: SHIREMAN    Medication PA needed: Tirzepatide-Weight Management (ZEPBOUND) 2.5 MG/0.5ML solution auto-injector     Reason for call/Prior Auth: PRIOR AUTH NEEDED    [facility]/pharmacy #[id number] - [employer], [place] [address] AT [place] [address] - [phone] [place] [phone] FX

## 2025-03-11 NOTE — ASSESSMENT & PLAN NOTE
Start Zepbound 2.5 mg injection weekly.  There is no known personal or family history of medullary thyroid cancer.   There is no known history of pancreatitis.   There are no current plans for pregnancy.   Short term goal: Loss of at least 5 percent of total body weight at three to six months.   Diet/Nutritional counseling:  Discussed balanced low-calorie, high-protein low carbohydrate diet, Mediterranean diet. Discussed importance of dietary adherence.   Exercise: Discussed minimum of 150 minutes of exercise per week (such as brisk walking)  Behavior modification: Discussed modifying and monitoring food intake, increasing physical activity, and controlling environmental cues/stimuli that may trigger eating.   Follow-up in 3 months.

## 2025-03-12 DIAGNOSIS — Z12.4 ENCOUNTER FOR PAPANICOLAOU SMEAR FOR CERVICAL CANCER SCREENING: Primary | ICD-10-CM

## 2025-03-12 NOTE — TELEPHONE ENCOUNTER
CALLED AND SPOKE WITH JOSH    I PUT IN A NEW REFERRAL SINCE THE ONE IN CHART WAS FROM [facility].  [facility]     PT INFORMED

## 2025-03-19 ENCOUNTER — TELEPHONE (OUTPATIENT)
Dept: FAMILY MEDICINE CLINIC | Facility: CLINIC | Age: 35
End: 2025-03-19
Payer: OTHER GOVERNMENT

## 2025-03-19 DIAGNOSIS — E66.09 CLASS 2 OBESITY DUE TO EXCESS CALORIES WITHOUT SERIOUS COMORBIDITY WITH BODY MASS INDEX (BMI) OF 39.0 TO 39.9 IN ADULT: Primary | ICD-10-CM

## 2025-03-19 DIAGNOSIS — E66.812 CLASS 2 OBESITY DUE TO EXCESS CALORIES WITHOUT SERIOUS COMORBIDITY WITH BODY MASS INDEX (BMI) OF 39.0 TO 39.9 IN ADULT: Primary | ICD-10-CM

## 2025-03-19 NOTE — TELEPHONE ENCOUNTER
Received notification that Zepbound will not be covered until there is a trial of phentermine.  I have entered an order for a urine drug screen and the patient needs to complete an updated controlled substance contract for 2025.  Once these 2 items have been completed, I will send the first month of phentermine to her pharmacy.  After the first month, will likely increase the dose as long as the medication is tolerated well.  Then, she should follow-up in office within 3 months.

## 2025-03-21 ENCOUNTER — LAB (OUTPATIENT)
Dept: FAMILY MEDICINE CLINIC | Facility: CLINIC | Age: 35
End: 2025-03-21
Payer: OTHER GOVERNMENT

## 2025-03-21 DIAGNOSIS — E66.09 CLASS 2 OBESITY DUE TO EXCESS CALORIES WITHOUT SERIOUS COMORBIDITY WITH BODY MASS INDEX (BMI) OF 39.0 TO 39.9 IN ADULT: ICD-10-CM

## 2025-03-21 DIAGNOSIS — E66.812 CLASS 2 OBESITY DUE TO EXCESS CALORIES WITHOUT SERIOUS COMORBIDITY WITH BODY MASS INDEX (BMI) OF 39.0 TO 39.9 IN ADULT: ICD-10-CM

## 2025-03-21 LAB
AMPHET+METHAMPHET UR QL: NEGATIVE
AMPHETAMINES UR QL: NEGATIVE
BARBITURATES UR QL SCN: NEGATIVE
BENZODIAZ UR QL SCN: NEGATIVE
BUPRENORPHINE SERPL-MCNC: NEGATIVE NG/ML
CANNABINOIDS SERPL QL: NEGATIVE
COCAINE UR QL: NEGATIVE
METHADONE UR QL SCN: NEGATIVE
OPIATES UR QL: NEGATIVE
OXYCODONE UR QL SCN: NEGATIVE
PCP UR QL SCN: NEGATIVE
TRICYCLICS UR QL SCN: NEGATIVE

## 2025-03-21 PROCEDURE — 80306 DRUG TEST PRSMV INSTRMNT: CPT | Performed by: NURSE PRACTITIONER

## 2025-03-22 ENCOUNTER — RESULTS FOLLOW-UP (OUTPATIENT)
Dept: FAMILY MEDICINE CLINIC | Facility: CLINIC | Age: 35
End: 2025-03-22
Payer: OTHER GOVERNMENT

## 2025-03-22 DIAGNOSIS — E66.812 CLASS 2 OBESITY DUE TO EXCESS CALORIES WITHOUT SERIOUS COMORBIDITY WITH BODY MASS INDEX (BMI) OF 39.0 TO 39.9 IN ADULT: Primary | ICD-10-CM

## 2025-03-22 DIAGNOSIS — E66.09 CLASS 2 OBESITY DUE TO EXCESS CALORIES WITHOUT SERIOUS COMORBIDITY WITH BODY MASS INDEX (BMI) OF 39.0 TO 39.9 IN ADULT: Primary | ICD-10-CM

## 2025-03-22 RX ORDER — PHENTERMINE HYDROCHLORIDE 15 MG/1
15 CAPSULE ORAL EVERY MORNING
Qty: 30 CAPSULE | Refills: 0 | Status: SHIPPED | OUTPATIENT
Start: 2025-03-22

## 2025-04-15 ENCOUNTER — OFFICE VISIT (OUTPATIENT)
Dept: ENDOCRINOLOGY | Facility: CLINIC | Age: 35
End: 2025-04-15
Payer: OTHER GOVERNMENT

## 2025-04-15 VITALS
WEIGHT: 243.4 LBS | OXYGEN SATURATION: 98 % | BODY MASS INDEX: 40.55 KG/M2 | HEIGHT: 65 IN | SYSTOLIC BLOOD PRESSURE: 122 MMHG | DIASTOLIC BLOOD PRESSURE: 62 MMHG | HEART RATE: 76 BPM

## 2025-04-15 DIAGNOSIS — E66.01 MORBID OBESITY: ICD-10-CM

## 2025-04-15 DIAGNOSIS — D35.02 ADENOMA OF LEFT ADRENAL GLAND: Primary | ICD-10-CM

## 2025-04-15 NOTE — PATIENT INSTRUCTIONS
Please,    - Plan for repeat CT abdomen at [facility].  - Plan for spot blood test and plan for 24-hour urine evaluation along with midnight salivary cortisol testing.  - You can go to [facility] express lab for evaluations.    Plan for tentative follow-up in 6 weeks time.    Thank you for your visit today.    If you have any questions or concerns please feel free to reach out of the office.

## 2025-04-15 NOTE — PROGRESS NOTES
-----------------------------------------------------------------  ENDOCRINE CLINIC NOTE  -----------------------------------------------------------------        PATIENT NAME: Stephenie Bridges  PATIENT : 1990 AGE: 34 y.o.  MRN NUMBER: [id number]  PRIMARY CARE: Judy Mcelroy APRN    ==========================================================================    CHIEF COMPLAINT: Left adrenal nodule  DATE OF SERVICE: 04/15/25    ==========================================================================    HPI / SUBJECTIVE    34 y.o. female is seen in the clinic today for left adrenal nodule.  Patient in the 2024 had a CT scan done for abdominal pain was found to have incidental finding of left adrenal nodule and patient is now referred to endocrine clinic for further evaluation.  Denied any previous history of hypertension or diabetes.  Denied any family hx of adrenal issues.  Patient is active duty.  Currently have IUD in place. Reports exposure to radiation material, she is with [employer].  Patient reports that she have always wear her RAD meter and there have been no incident which have been reported.    ==========================================================================                                                PAST MEDICAL HISTORY    Past Medical History:   Diagnosis Date    ADHD (attention deficit hyperactivity disorder) 2018    Health examination of defined subpopulation 2023    Problems in relationship with spouse or partner 2018       ==========================================================================    PAST SURGICAL HISTORY    Past Surgical History:   Procedure Laterality Date    TONSILLECTOMY  1992       ==========================================================================    FAMILY HISTORY    Family History   Problem Relation Age of Onset    Anxiety disorder Mother     Alcohol abuse Father         Passed in         ==========================================================================    SOCIAL HISTORY    Social History     Socioeconomic History    Marital status: Single   Tobacco Use    Smoking status: Former     Current packs/day: 0.50     Average packs/day: 0.5 packs/day for 22.3 years (11.1 ttl pk-yrs)     Types: Cigarettes     Start date: 2003     Passive exposure: Past    Smokeless tobacco: Never   Vaping Use    Vaping status: Every Day    Start date: 10/1/2023    Substances: Nicotine    Devices: Disposable   Substance and Sexual Activity    Alcohol use: Not Currently     Alcohol/week: 2.0 standard drinks of alcohol     Types: 2 Cans of beer per week     Comment: When i am not taking my meds I enjoy a special occasion    Drug use: Never    Sexual activity: Yes     Partners: Female, Male     Birth control/protection: Condom, I.U.D., Partner of same sex       ==========================================================================    MEDICATIONS      Current Outpatient Medications:     atomoxetine (STRATTERA) 40 MG capsule, Take 1 capsule by mouth Daily., Disp: 90 capsule, Rfl: 0    benzonatate (Tessalon Perles) 100 MG capsule, Take 1 capsule by mouth 3 (Three) Times a Day As Needed for Cough., Disp: 30 capsule, Rfl: 0    buPROPion XL (WELLBUTRIN XL) 300 MG 24 hr tablet, Take 1 tablet by mouth Daily., Disp: 90 tablet, Rfl: 0    doxycycline (VIBRAMYCIN) 100 MG capsule, Take 1 capsule by mouth 2 (Two) Times a Day., Disp: 60 capsule, Rfl: 0    EPINEPHrine (EpiPen 2-Kamaljit) 0.3 MG/0.3ML solution auto-injector injection, INJECT 0.3 MILLILITER (0.3 MG) BY INTRAMUSCULAR ROUTE ONCE AS NEEDED FOR ANAPHYLAXIS, Disp: , Rfl:     phentermine 15 MG capsule, Take 1 capsule by mouth Every Morning., Disp: 30 capsule, Rfl: 0    Tirzepatide-Weight Management (ZEPBOUND) 2.5 MG/0.5ML solution auto-injector, Inject 0.5 mL under the skin into the appropriate area as directed 1 (One) Time Per Week., Disp: 2 mL, Rfl:  2    ==========================================================================    ALLERGIES    Allergies   Allergen Reactions    Bee Venom Anaphylaxis       ==========================================================================    OBJECTIVE    Vitals:    04/15/25 1314   BP: 122/62   Pulse: 76   SpO2: 98%     Body mass index is 40.5 kg/m².     General: Alert, cooperative, no acute distress  Thyroid:  no enlargement/tenderness/palpable nodules  Lungs: Clear to auscultation bilaterally, respirations unlabored  Heart: Regular rate and rhythm, S1 and S2 normal, no murmur, rub or gallop  Abdomen: Soft, NT, ND and Bowel sounds Positive  Extremities:  Extremities normal, atraumatic, no cyanosis or edema    ==========================================================================    LAB EVALUATION    Lab Results   Component Value Date    GLUCOSE 91 03/03/2025    BUN 11 03/03/2025    CREATININE 0.75 03/03/2025    BCR 14.7 03/03/2025    K 4.2 03/03/2025    CO2 23.0 03/03/2025    CALCIUM 9.1 03/03/2025    ALBUMIN 4.0 03/03/2025    AST 20 03/03/2025    ALT 19 03/03/2025    CHOL 167 03/03/2025    TRIG 59 03/03/2025    HDL 55 03/03/2025     03/03/2025     Lab Results   Component Value Date    HGBA1C 5.10 03/03/2025    HGBA1C 5.00 12/29/2023     Lab Results   Component Value Date    CREATININE 0.75 03/03/2025     Lab Results   Component Value Date    TSH 2.310 03/03/2025     ==========================================================================    CT ABDOMEN PELVIS W CONTRAST     12/4/2024       Slight asymmetric nodular thickening left adrenal gland versus low-density 1.3 cm left adrenal nodule.     IMPRESSION:    Incidental low-density nodular thickening versus discrete 1.3 cm left adrenal nodule favoring benign adenoma.    ==========================================================================    ASSESSMENT AND PLAN    # Left Adrenal Adenoma  - Reviewed CT scan results and also spoke with the radiologist over  the phone, as per this current evaluation Hounsfield unit calculation for this nodule is around 30-35  - Patient have a previous history of possible radiation exposure  - Will start evaluation by checking CT abdomen with and without contrast with Hounsfield calculation, adrenal protocol triphase, if needed will can also plan for MRI adrenal as well  - For hormonal evaluation will start evaluation by checking BMP along with aldosterone, renin, catecholamines, 24-hour urine cortisol and midnight salivary cortisol  - Patient is currently maintained on IUD therefore avoiding DST  - Patient to follow-up in clinic back again in 6 weeks time    # Morbid obesity with BMI of 40.50    Thank you for courtesy of consultation.    Return to clinic: 6 weeks    Entire assessment and plan was discussed and counseled the patient in detail to which patient verbalized understanding and agreed with care.  Answered all queries and concerns.    Part of this note may be an electronic transcription/translation of spoken language to printed text using the Dragon Dictation System.     Note: Portions of this note may have been copied from previous notes but documentation have been reviewed and edited as necessary to support clinical decision making for today's visit.    ==========================================================================    INFORMATION PROVIDED TO PATIENT    Patient Instructions   Please,    - Plan for repeat CT abdomen at [facility].  - Plan for spot blood test and plan for 24-hour urine evaluation along with midnight salivary cortisol testing.  - You can go to [facility] express lab for evaluations.    Plan for tentative follow-up in 6 weeks time.    Thank you for your visit today.    If you have any questions or concerns please feel free to reach out of the office.       ==========================================================================  Frank Krishnan MD  Department of Endocrine, Diabetes and  Metabolism  [facility]  [place], [place]  ==========================================================================

## 2025-04-17 DIAGNOSIS — E66.812 CLASS 2 OBESITY DUE TO EXCESS CALORIES WITHOUT SERIOUS COMORBIDITY WITH BODY MASS INDEX (BMI) OF 39.0 TO 39.9 IN ADULT: Primary | ICD-10-CM

## 2025-04-17 DIAGNOSIS — E66.09 CLASS 2 OBESITY DUE TO EXCESS CALORIES WITHOUT SERIOUS COMORBIDITY WITH BODY MASS INDEX (BMI) OF 39.0 TO 39.9 IN ADULT: Primary | ICD-10-CM

## 2025-04-17 RX ORDER — PHENTERMINE HYDROCHLORIDE 30 MG/1
30 CAPSULE ORAL EVERY MORNING
Qty: 30 CAPSULE | Refills: 0 | Status: SHIPPED | OUTPATIENT
Start: 2025-04-17

## 2025-04-18 ENCOUNTER — PATIENT ROUNDING (BHMG ONLY) (OUTPATIENT)
Dept: ENDOCRINOLOGY | Facility: CLINIC | Age: 35
End: 2025-04-18
Payer: OTHER GOVERNMENT

## 2025-04-18 NOTE — PROGRESS NOTES
April 18, 2025    Hello, may I speak with Stephenie Bridges?    My name is hernán      I am  with [facility]  [address] [facility] [place] [id number].    Before we get started may I verify your date of birth? 1990    I am calling to officially welcome you to our practice and ask about your recent visit. Is this a good time to talk? yes    Tell me about your visit with us. What things went well?  everything       We're always looking for ways to make our patients' experiences even better. Do you have recommendations on ways we may improve?  no    Overall were you satisfied with your first visit to our practice? yes       I appreciate you taking the time to speak with me today. Is there anything else I can do for you? no      Thank you, and have a great day.

## 2025-04-23 ENCOUNTER — LAB (OUTPATIENT)
Dept: LAB | Facility: HOSPITAL | Age: 35
End: 2025-04-23
Payer: OTHER GOVERNMENT

## 2025-04-23 DIAGNOSIS — K64.4 EXTERNAL HEMORRHOIDS: Primary | ICD-10-CM

## 2025-04-23 DIAGNOSIS — L73.2 HIDRADENITIS SUPPURATIVA: ICD-10-CM

## 2025-04-23 DIAGNOSIS — Z00.00 ANNUAL PHYSICAL EXAM: ICD-10-CM

## 2025-04-23 DIAGNOSIS — E66.812 CLASS 2 OBESITY DUE TO EXCESS CALORIES WITHOUT SERIOUS COMORBIDITY WITH BODY MASS INDEX (BMI) OF 39.0 TO 39.9 IN ADULT: ICD-10-CM

## 2025-04-23 DIAGNOSIS — E27.9 ADRENAL NODULE: ICD-10-CM

## 2025-04-23 DIAGNOSIS — F90.2 ATTENTION DEFICIT HYPERACTIVITY DISORDER, COMBINED TYPE: ICD-10-CM

## 2025-04-23 DIAGNOSIS — L70.0 CYSTIC ACNE: ICD-10-CM

## 2025-04-23 DIAGNOSIS — E66.09 CLASS 2 OBESITY DUE TO EXCESS CALORIES WITHOUT SERIOUS COMORBIDITY WITH BODY MASS INDEX (BMI) OF 39.0 TO 39.9 IN ADULT: ICD-10-CM

## 2025-04-23 DIAGNOSIS — F32.A ANXIETY AND DEPRESSION: ICD-10-CM

## 2025-04-23 DIAGNOSIS — F43.29 MIXED EMOTIONAL FEATURES AS ADJUSTMENT REACTION: ICD-10-CM

## 2025-04-23 DIAGNOSIS — D35.02 ADENOMA OF LEFT ADRENAL GLAND: ICD-10-CM

## 2025-04-23 DIAGNOSIS — E53.8 VITAMIN B12 DEFICIENCY (NON ANEMIC): ICD-10-CM

## 2025-04-23 DIAGNOSIS — F41.9 ANXIETY AND DEPRESSION: ICD-10-CM

## 2025-04-23 LAB
ANION GAP SERPL CALCULATED.3IONS-SCNC: 11 MMOL/L (ref 5–15)
BUN SERPL-MCNC: 13 MG/DL (ref 6–20)
BUN/CREAT SERPL: 17.1 (ref 7–25)
CALCIUM SPEC-SCNC: 9.2 MG/DL (ref 8.6–10.5)
CHLORIDE SERPL-SCNC: 104 MMOL/L (ref 98–107)
CO2 SERPL-SCNC: 23 MMOL/L (ref 22–29)
CREAT SERPL-MCNC: 0.76 MG/DL (ref 0.57–1)
EGFRCR SERPLBLD CKD-EPI 2021: 104.3 ML/MIN/1.73
GLUCOSE SERPL-MCNC: 84 MG/DL (ref 65–99)
POTASSIUM SERPL-SCNC: 4 MMOL/L (ref 3.5–5.2)
SODIUM SERPL-SCNC: 138 MMOL/L (ref 136–145)

## 2025-04-23 PROCEDURE — 80048 BASIC METABOLIC PNL TOTAL CA: CPT

## 2025-04-23 PROCEDURE — 36415 COLL VENOUS BLD VENIPUNCTURE: CPT

## 2025-04-23 PROCEDURE — 84244 ASSAY OF RENIN: CPT

## 2025-04-23 PROCEDURE — 82088 ASSAY OF ALDOSTERONE: CPT

## 2025-04-23 PROCEDURE — 82384 ASSAY THREE CATECHOLAMINES: CPT

## 2025-04-27 LAB
DOPAMINE SERPL-MCNC: 15 PG/ML (ref 0–36.7)
EPINEPH PLAS-MCNC: 58 PG/ML (ref 0–55.4)
NOREPINEPH PLAS-MCNC: 474 PG/ML (ref 115–524)

## 2025-04-28 ENCOUNTER — LAB (OUTPATIENT)
Dept: LAB | Facility: HOSPITAL | Age: 35
End: 2025-04-28
Payer: OTHER GOVERNMENT

## 2025-04-28 DIAGNOSIS — D35.02 ADENOMA OF LEFT ADRENAL GLAND: ICD-10-CM

## 2025-04-28 LAB
COLLECT DURATION TIME UR: 24 HRS
CREAT UR-MCNC: 103.1 MG/DL
CREATINE 24H UR-MRATE: 1.55 G/24 HR (ref 0.7–1.6)
RENIN PLAS-CCNC: 1.18 NG/ML/HR (ref 0.17–5.38)
SPECIMEN VOL 24H UR: 1500 ML

## 2025-04-28 PROCEDURE — 81050 URINALYSIS VOLUME MEASURE: CPT

## 2025-04-28 PROCEDURE — 82530 CORTISOL FREE: CPT

## 2025-04-28 PROCEDURE — 82533 TOTAL CORTISOL: CPT

## 2025-04-28 PROCEDURE — 82570 ASSAY OF URINE CREATININE: CPT

## 2025-04-29 LAB — ALDOST SERPL-MCNC: 8.2 NG/DL (ref 0–30)

## 2025-05-02 LAB
CORTIS F 24H UR-MCNC: 34 UG/L
CORTIS F 24H UR-MRATE: 51 UG/24 HR (ref 6–42)

## 2025-05-03 LAB — CORTIS SAL-MCNC: 0.03 UG/DL

## 2025-05-21 ENCOUNTER — HOSPITAL ENCOUNTER (OUTPATIENT)
Dept: CT IMAGING | Facility: HOSPITAL | Age: 35
Discharge: HOME | End: 2025-05-21
Admitting: INTERNAL MEDICINE
Payer: OTHER GOVERNMENT

## 2025-05-21 DIAGNOSIS — D35.02 ADENOMA OF LEFT ADRENAL GLAND: ICD-10-CM

## 2025-05-21 PROCEDURE — 25510000001 IOPAMIDOL PER 1 ML: Performed by: INTERNAL MEDICINE

## 2025-05-21 PROCEDURE — 74170 CT ABD WO CNTRST FLWD CNTRST: CPT

## 2025-05-21 RX ORDER — IOPAMIDOL 755 MG/ML
100 INJECTION, SOLUTION INTRAVASCULAR
Status: COMPLETED | OUTPATIENT
Start: 2025-05-21 | End: 2025-05-21

## 2025-05-21 RX ADMIN — IOPAMIDOL 100 ML: 755 INJECTION, SOLUTION INTRAVENOUS at 10:42

## 2025-06-02 ENCOUNTER — OFFICE VISIT (OUTPATIENT)
Dept: ENDOCRINOLOGY | Facility: CLINIC | Age: 35
End: 2025-06-02
Payer: OTHER GOVERNMENT

## 2025-06-02 VITALS
WEIGHT: 243 LBS | HEART RATE: 87 BPM | HEIGHT: 65 IN | BODY MASS INDEX: 40.48 KG/M2 | OXYGEN SATURATION: 99 % | SYSTOLIC BLOOD PRESSURE: 125 MMHG | DIASTOLIC BLOOD PRESSURE: 72 MMHG

## 2025-06-02 DIAGNOSIS — R79.89 ABNORMAL CORTISOL LEVEL: ICD-10-CM

## 2025-06-02 DIAGNOSIS — E66.01 MORBID OBESITY: ICD-10-CM

## 2025-06-02 DIAGNOSIS — D35.02 ADENOMA OF LEFT ADRENAL GLAND: Primary | ICD-10-CM

## 2025-06-02 PROCEDURE — 99214 OFFICE O/P EST MOD 30 MIN: CPT | Performed by: INTERNAL MEDICINE

## 2025-06-02 RX ORDER — ADALIMUMAB 40MG/0.4ML
KIT SUBCUTANEOUS
COMMUNITY

## 2025-06-02 RX ORDER — TRETINOIN 0.5 MG/G
1 CREAM TOPICAL NIGHTLY
COMMUNITY

## 2025-06-02 NOTE — PROGRESS NOTES
-----------------------------------------------------------------  ENDOCRINE CLINIC NOTE  -----------------------------------------------------------------        PATIENT NAME: Stephenie Bridges  PATIENT : 1990 AGE: 34 y.o.  MRN NUMBER: [id number]  PRIMARY CARE: Judy Mcelroy APRN    ==========================================================================    CHIEF COMPLAINT: Left adrenal nodule  DATE OF SERVICE: 25    ==========================================================================    HPI / SUBJECTIVE    34 y.o. female is seen in the clinic today for left adrenal nodule.  Patient in 2024 had CT scanning done for abdominal pain was found to incidental finding of left adrenal nodule which was 1.3 cm.  Patient was referred to endocrine clinic for further evaluation.  Patient denies any previous history of hypertension or diabetes.  Denied any family history of adrenal issues.  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  Patient is with [employer].  Patient always wears her RAD meter and there have been no incident where she had exposure directly to the radioactive material beyond recommended range.  Patient currently have IUD in place.    ==========================================================================                                                PAST MEDICAL HISTORY    Past Medical History:   Diagnosis Date    ADHD (attention deficit hyperactivity disorder) 2018    Health examination of defined subpopulation 2023    Problems in relationship with spouse or partner 2018       ==========================================================================    PAST SURGICAL HISTORY    Past Surgical History:   Procedure Laterality Date    TONSILLECTOMY  1992       ==========================================================================    FAMILY HISTORY    Family History   Problem Relation Age of Onset    Anxiety  disorder Mother     Alcohol abuse Father         Passed in 2011       ==========================================================================    SOCIAL HISTORY    Social History     Socioeconomic History    Marital status: Single    Number of children: 0    Years of education: 12   Tobacco Use    Smoking status: Former     Current packs/day: 0.50     Average packs/day: 0.5 packs/day for 22.4 years (11.2 ttl pk-yrs)     Types: Cigarettes     Start date: 2003     Passive exposure: Past    Smokeless tobacco: Never   Vaping Use    Vaping status: Every Day    Start date: 10/1/2023    Substances: Nicotine    Devices: Disposable   Substance and Sexual Activity    Alcohol use: Not Currently     Alcohol/week: 2.0 standard drinks of alcohol     Types: 2 Cans of beer per week     Comment: When i am not taking my meds I enjoy a special occasion    Drug use: Never    Sexual activity: Yes     Partners: Female, Male     Birth control/protection: Condom, I.U.D., Partner of same sex       ==========================================================================    MEDICATIONS      Current Outpatient Medications:     Adalimumab (Humira, 2 Pen,) 40 MG/0.4ML Auto-injector Kit, Inject  under the skin into the appropriate area as directed., Disp: , Rfl:     atomoxetine (STRATTERA) 40 MG capsule, Take 1 capsule by mouth Daily., Disp: 90 capsule, Rfl: 0    benzonatate (Tessalon Perles) 100 MG capsule, Take 1 capsule by mouth 3 (Three) Times a Day As Needed for Cough., Disp: 30 capsule, Rfl: 0    buPROPion XL (WELLBUTRIN XL) 300 MG 24 hr tablet, Take 1 tablet by mouth Daily., Disp: 90 tablet, Rfl: 0    doxycycline (VIBRAMYCIN) 100 MG capsule, Take 1 capsule by mouth 2 (Two) Times a Day., Disp: 60 capsule, Rfl: 0    EPINEPHrine (EpiPen 2-Kamaljit) 0.3 MG/0.3ML solution auto-injector injection, INJECT 0.3 MILLILITER (0.3 MG) BY INTRAMUSCULAR ROUTE ONCE AS NEEDED FOR ANAPHYLAXIS, Disp: , Rfl:     phentermine 30 MG capsule, Take 1 capsule by  mouth Every Morning., Disp: 30 capsule, Rfl: 0    Tirzepatide-Weight Management (ZEPBOUND) 2.5 MG/0.5ML solution auto-injector, Inject 0.5 mL under the skin into the appropriate area as directed 1 (One) Time Per Week., Disp: 2 mL, Rfl: 2    tretinoin (RETIN-A) 0.05 % cream, Apply 1 Application topically to the appropriate area as directed Every Night., Disp: , Rfl:     ==========================================================================    ALLERGIES    Allergies   Allergen Reactions    Bee Venom Anaphylaxis       ==========================================================================    OBJECTIVE    Vitals:    06/02/25 1254   BP: 125/72   Pulse: 87   SpO2: 99%     Body mass index is 40.44 kg/m².     General: Alert, cooperative, no acute distress  Thyroid:  no enlargement/tenderness/palpable nodules  Lungs: Clear to auscultation bilaterally, respirations unlabored  Heart: Regular rate and rhythm, S1 and S2 normal, no murmur, rub or gallop  Abdomen: Soft, NT, ND and Bowel sounds Positive  Extremities:  Extremities normal, atraumatic, no cyanosis or edema    ==========================================================================    LAB EVALUATION    Lab Results   Component Value Date    GLUCOSE 84 04/23/2025    BUN 13 04/23/2025    CREATININE 0.76 04/23/2025    BCR 17.1 04/23/2025    K 4.0 04/23/2025    CO2 23.0 04/23/2025    CALCIUM 9.2 04/23/2025    ALBUMIN 4.0 03/03/2025    AST 20 03/03/2025    ALT 19 03/03/2025    CHOL 167 03/03/2025    TRIG 59 03/03/2025    HDL 55 03/03/2025     03/03/2025     Lab Results   Component Value Date    HGBA1C 5.10 03/03/2025    HGBA1C 5.00 12/29/2023     Lab Results   Component Value Date    CREATININE 0.76 04/23/2025     Lab Results   Component Value Date    TSH 2.310 03/03/2025     Component      Latest Ref Rng 4/23/2025 4/28/2025   Glucose      65 - 99 mg/dL 84     BUN      6 - 20 mg/dL 13     Creatinine      0.57 - 1.00 mg/dL 0.76     Sodium      136 - 145 mmol/L  138     Potassium      3.5 - 5.2 mmol/L 4.0     Chloride      98 - 107 mmol/L 104     CO2      22.0 - 29.0 mmol/L 23.0     Calcium      8.6 - 10.5 mg/dL 9.2     BUN/Creatinine Ratio      7.0 - 25.0  17.1     Anion Gap      5.0 - 15.0 mmol/L 11.0     eGFR      >60.0 mL/min/1.73 104.3     Creatinine, 24H       0.70 - 1.60 g/24 hr  1.55    Creatinine, Urine      mg/dL  103.1    Urine Volume      mL  1,500    Time (Hours)      hrs  24    Norepinephrine      115 - 524 pg/mL 474     Epinephrine      0.0 - 55.4 pg/mL 58.0 (H)     Dopamine      0.0 - 36.7 pg/mL 15.0     Cortisol, Free      Undefined ug/L  34    Cortisol, 24H Ur      6 - 42 ug/24 hr  51 (H)    Renin Activity      0.167 - 5.380 ng/mL/hr 1.176     Aldosterone      0.0 - 30.0 ng/dL 8.2     Cortisol, Salivary      ug/dL  0.027       Legend:  (H) High    ==========================================================================    CT ABDOMEN PELVIS W CONTRAST     12/4/2024       Slight asymmetric nodular thickening left adrenal gland versus low-density 1.3 cm left adrenal nodule.     IMPRESSION:    Incidental low-density nodular thickening versus discrete 1.3 cm left adrenal nodule favoring benign adenoma.    5/21/2025     The liver is normal in size, morphology and attenuation. No focal hepatic lesions are identified. The gallbladder is normal. There is no significant biliary ductal dilatation.     The pancreas, spleen and right adrenal gland are within normal limits. Left adrenal nodule measuring 18 x 11 mm, measures 5 HU on the precontrast series, compatible with adenoma.     The kidneys enhance symmetrically. No hydronephrosis. No focal renal lesions.     No significant mesenteric or retroperitoneal lymphadenopathy. No ascites. Abdominal aorta is normal in caliber.     GI tract within the abdomen appears grossly unremarkable.     No aggressive osseous lesions are identified.       ==========================================================================    ASSESSMENT AND PLAN    # Left Adrenal Adenoma  # Abnormal 24-hour urine cortisol levels  - Reviewed CT scan reports from December 2024 and May 2025  - Care was discussed with radiologist over the phone about the CT scan in December 2024 and as per the recommendation patient have 1.3 cm discrete left adrenal nodule favoring adenoma but the Hounsfield calculation was around 30-35  - Patient recently had CT scan abdomen repeated in May 2025 which shows left adrenal adenoma which is 1.8 x 1.1 cm but the precontrast Hounsfield calculation is 5 HU  - There is a significant growth of the adrenal nodule in last 6 months  - Additionally patient do have IUD in place therefore avoiding DST but check 24-hour urine cortisol and midnight salivary cortisol levels  - Patient have mildly elevated 24-hour urine cortisol with his midnight salivary cortisol levels within acceptable limit  - Otherwise serum aldosterone and catecholamine levels are within normal limits  - There are 2 issues given the discrepancy of the CT scan and possibility of elevated cortisol discussed with patient about either clinically monitoring and repeating evaluation in 3 months versus getting a second opinion and possibly evaluation by surgical oncology patient plans to pursue evaluation with second opinion at [facility] along with surgical oncology evaluation at [facility]  - Patient have significant risk factor that is exposure to radiation while actively working with [employer]  - Will send appropriate referrals  - Will give a tentative follow-up with me in 3 months time so that she is not lost to follow-up    # Morbid obesity with BMI of 40.44    Return to clinic: 3 months    Entire assessment and plan was discussed and counseled the patient in detail to which patient verbalized understanding and agreed with care.  Answered all queries and concerns.    Part of this  note may be an electronic transcription/translation of spoken language to printed text using the Dragon Dictation System.     Note: Portions of this note may have been copied from previous notes but documentation have been reviewed and edited as necessary to support clinical decision making for today's visit.    ==========================================================================    INFORMATION PROVIDED TO PATIENT    Patient Instructions   Please,    - Establish care with surgical oncology Dr. Suly Dsouza  - Also please take an opinion from endocrinology team at [facility]    Plan for tentative follow-up in 3 months time.  If you are able to establish care at [facility] endocrinology department you can continue to follow-up with endocrine department at [facility] and can be seen in my clinic as needed.    Thank you for your visit today.    If you have any questions or concerns please feel free to reach out of the office.       ==========================================================================  Frank Krishnan MD  Department of Endocrine, Diabetes and Metabolism  [facility], [place]  ==========================================================================

## 2025-06-02 NOTE — PATIENT INSTRUCTIONS
Please,    - Establish care with surgical oncology Dr. Suly Dsouza  - Also please take an opinion from endocrinology team at [facility]    Plan for tentative follow-up in 3 months time.  If you are able to establish care at [facility] endocrinology department you can continue to follow-up with endocrine department at [facility] and can be seen in my clinic as needed.    Thank you for your visit today.    If you have any questions or concerns please feel free to reach out of the office.

## 2025-06-06 DIAGNOSIS — F41.9 ANXIETY AND DEPRESSION: ICD-10-CM

## 2025-06-06 DIAGNOSIS — F32.A ANXIETY AND DEPRESSION: ICD-10-CM

## 2025-06-07 RX ORDER — BUPROPION HYDROCHLORIDE 300 MG/1
300 TABLET ORAL DAILY
Qty: 90 TABLET | Refills: 0 | Status: SHIPPED | OUTPATIENT
Start: 2025-06-07

## 2025-06-10 ENCOUNTER — OFFICE VISIT (OUTPATIENT)
Dept: FAMILY MEDICINE CLINIC | Facility: CLINIC | Age: 35
End: 2025-06-10
Payer: OTHER GOVERNMENT

## 2025-06-10 VITALS
DIASTOLIC BLOOD PRESSURE: 70 MMHG | RESPIRATION RATE: 18 BRPM | OXYGEN SATURATION: 98 % | HEART RATE: 75 BPM | HEIGHT: 65 IN | BODY MASS INDEX: 40.9 KG/M2 | SYSTOLIC BLOOD PRESSURE: 110 MMHG | TEMPERATURE: 97.8 F | WEIGHT: 245.5 LBS

## 2025-06-10 DIAGNOSIS — E66.813 CLASS 3 SEVERE OBESITY DUE TO EXCESS CALORIES WITHOUT SERIOUS COMORBIDITY WITH BODY MASS INDEX (BMI) OF 40.0 TO 44.9 IN ADULT: ICD-10-CM

## 2025-06-10 DIAGNOSIS — L73.2 HIDRADENITIS SUPPURATIVA: ICD-10-CM

## 2025-06-10 DIAGNOSIS — F41.9 ANXIETY AND DEPRESSION: Primary | ICD-10-CM

## 2025-06-10 DIAGNOSIS — E66.01 CLASS 3 SEVERE OBESITY DUE TO EXCESS CALORIES WITHOUT SERIOUS COMORBIDITY WITH BODY MASS INDEX (BMI) OF 40.0 TO 44.9 IN ADULT: ICD-10-CM

## 2025-06-10 DIAGNOSIS — E27.9 ADRENAL NODULE: ICD-10-CM

## 2025-06-10 DIAGNOSIS — F90.2 ATTENTION DEFICIT HYPERACTIVITY DISORDER, COMBINED TYPE: ICD-10-CM

## 2025-06-10 DIAGNOSIS — F32.A ANXIETY AND DEPRESSION: Primary | ICD-10-CM

## 2025-06-10 PROCEDURE — 99214 OFFICE O/P EST MOD 30 MIN: CPT | Performed by: NURSE PRACTITIONER

## 2025-06-10 RX ORDER — PHENTERMINE HYDROCHLORIDE 30 MG/1
30 CAPSULE ORAL EVERY MORNING
Qty: 30 CAPSULE | Refills: 0 | Status: SHIPPED | OUTPATIENT
Start: 2025-06-10

## 2025-06-10 RX ORDER — ATOMOXETINE 40 MG/1
40 CAPSULE ORAL DAILY
Qty: 90 CAPSULE | Refills: 0 | Status: SHIPPED | OUTPATIENT
Start: 2025-06-10

## 2025-06-10 NOTE — ASSESSMENT & PLAN NOTE
Psychological condition is stable.  Continue Strattera 40 mg daily.  Continue current treatment regimen.  Psychological condition  will be reassessed in 6 months

## 2025-06-10 NOTE — PROGRESS NOTES
"Chief Complaint  Primary Care Follow-Up    Subjective        Stephenie Bridges presents to [facility]  History of Present Illness    Patient presents today to follow-up on anxiety, depression, ADD, obesity, and update on dermatology and endocrinology visits.    Anxiety/depression/ADD: Status is stable.  Current medication includes bupropion 300 mg extended release daily and Strattera 40 mg daily.   Reported anxiety depressive symptoms controlled.  Doing well with attention and focus-completing tasks.  No change in weight gain.  Positive history of binge eating.  Negative for any suicidal or homicidal ideations.    Obesity: Status is no change.  Recently prescribed medications including Zepbound 2.5 mg weekly injection- not approved until trial of phentermine 30 mg- needs 3 months- has completed 2 months.   Interested in compounded medication if Zepbound not approved. Goal weight- 165 pounds.  Current weight 245 pounds.  End date- 1 year.  Diet- Always hungry; lots of food noise. Healthy choices with snacking. Activity-Active at gym routinely.     Dermatology update: To start Humira weekly for hidradenitis. Follows with [facility].     Endocrinology update: There was follow-up visit with endocrinology June 2, 2025 for adrenal adenoma. Nodule larger. Referred to [facility] endocrinology/surgery.         Objective   Vital Signs:  /70 (BP Location: Left arm, Patient Position: Sitting, Cuff Size: Adult)   Pulse 75   Temp 97.8 °F (36.6 °C) (Temporal)   Resp 18   Ht 165.1 cm (65\")   Wt 111 kg (245 lb 8 oz)   SpO2 98%   BMI 40.85 kg/m²   Estimated body mass index is 40.85 kg/m² as calculated from the following:    Height as of this encounter: 165.1 cm (65\").    Weight as of this encounter: 111 kg (245 lb 8 oz).            Physical Exam  Constitutional:       General: She is not in acute distress.     Appearance: She is well-groomed. She is obese.   Cardiovascular:      Rate " and Rhythm: Normal rate and regular rhythm.      Heart sounds: Normal heart sounds, S1 normal and S2 normal.   Pulmonary:      Effort: Pulmonary effort is normal.      Breath sounds: Normal breath sounds and air entry.   Musculoskeletal:      Right lower leg: No edema.      Left lower leg: No edema.   Skin:     General: Skin is warm and dry.   Neurological:      Mental Status: She is alert and oriented to person, place, and time.      Gait: Gait is intact.   Psychiatric:         Mood and Affect: Mood normal.         Thought Content: Thought content normal.         Judgment: Judgment normal.        Result Review :    CMP          12/4/2024    12:52 3/3/2025    09:26 4/23/2025    08:44   CMP   Glucose 125  91  84    BUN 9  11  13    Creatinine 0.68  0.75  0.76    EGFR 115.9  106.0  104.3    Sodium 137  140  138    Potassium 3.7  4.2  4.0    Chloride 103  106  104    Calcium 9.7  9.1  9.2    Total Protein 7.7  6.9     Albumin 4.5  4.0     Globulin 3.2  2.9     Total Bilirubin 0.4  0.4     Alkaline Phosphatase 74  76     AST (SGOT) 23  20     ALT (SGPT) 17  19     Albumin/Globulin Ratio 1.4  1.4     BUN/Creatinine Ratio 13.2  14.7  17.1    Anion Gap 11.0  11.0  11.0      CBC          12/4/2024    12:52 3/3/2025    09:26   CBC   WBC 10.90  7.63    RBC 4.50  4.38    Hemoglobin 13.2  13.0    Hematocrit 39.9  38.9    MCV 88.7  88.8    MCH 29.3  29.7    MCHC 33.1  33.4    RDW 12.8  12.9    Platelets 270  323      CBC w/diff          12/4/2024    12:52 3/3/2025    09:26   CBC w/Diff   WBC 10.90  7.63    RBC 4.50  4.38    Hemoglobin 13.2  13.0    Hematocrit 39.9  38.9    MCV 88.7  88.8    MCH 29.3  29.7    MCHC 33.1  33.4    RDW 12.8  12.9    Platelets 270  323    Neutrophil Rel % 70.6  55.5    Immature Granulocyte Rel % 0.2  0.5    Lymphocyte Rel % 21.9  34.3    Monocyte Rel % 6.1  7.9    Eosinophil Rel % 0.6  1.0    Basophil Rel % 0.6  0.8      Lipid Panel          3/3/2025    09:26   Lipid Panel   Total Cholesterol 167     Triglycerides 59    HDL Cholesterol 55    VLDL Cholesterol 12    LDL Cholesterol  100    LDL/HDL Ratio 1.82      TSH          3/3/2025    09:26   TSH   TSH 2.310      BMP          12/4/2024    12:52 3/3/2025    09:26 4/23/2025    08:44   BMP   BUN 9  11  13    Creatinine 0.68  0.75  0.76    Sodium 137  140  138    Potassium 3.7  4.2  4.0    Chloride 103  106  104    CO2 23.0  23.0  23.0    Calcium 9.7  9.1  9.2      Most Recent A1C          3/3/2025    09:26   HGBA1C Most Recent   Hemoglobin A1C 5.10        UA          12/4/2024    11:15 3/3/2025    09:26   Urinalysis   Specific [place], UA  1.013    Ketones, UA Negative  Negative    Blood, UA  Negative    Leukocytes, UA Negative  Negative    Nitrite, UA  Negative                  Assessment and Plan   Diagnoses and all orders for this visit:    1. Anxiety and depression (Primary)  Assessment & Plan:  Continue bupropion 300 mg extended release daily.  Update/follow up 6 months.      2. Attention deficit hyperactivity disorder, combined type  Assessment & Plan:  Psychological condition is stable.  Continue Strattera 40 mg daily.  Continue current treatment regimen.  Psychological condition  will be reassessed in 6 months    Orders:  -     atomoxetine (STRATTERA) 40 MG capsule; Take 1 capsule by mouth Daily.  Dispense: 90 capsule; Refill: 0    3. Class 3 severe obesity due to excess calories without serious comorbidity with body mass index (BMI) of 40.0 to 44.9 in adult  Assessment & Plan:  Patient's (Body mass index is 40.85 kg/m².) indicates that they are morbidly/severely obese (BMI > 40 or > 35 with obesity - related health condition) with health conditions that include none . Weight is unchanged. BMI  is above average; BMI management plan is completed. We discussed low calorie, low carb based diet program, portion control, increasing exercise, management of depression/anxiety/stress to control compensatory eating, and can continue phentermine 30 mg daily for  now.  Inspect reviewed.  Follow-up in office in 1 month..     Orders:  -     phentermine 30 MG capsule; Take 1 capsule by mouth Every Morning.  Dispense: 30 capsule; Refill: 0    4. Adrenal nodule  Comments:  Follow-up with endocrinology as directed.  Update next visit.    5. Hidradenitis suppurativa  Comments:  Follow-up with dermatology as directed.  Update next visit.             Follow Up   Return in about 1 month (around 7/10/2025).  Patient was given instructions and counseling regarding her condition or for health maintenance advice. Please see specific information pulled into the AVS if appropriate.

## 2025-06-10 NOTE — ASSESSMENT & PLAN NOTE
Patient's (Body mass index is 40.85 kg/m².) indicates that they are morbidly/severely obese (BMI > 40 or > 35 with obesity - related health condition) with health conditions that include none . Weight is unchanged. BMI  is above average; BMI management plan is completed. We discussed low calorie, low carb based diet program, portion control, increasing exercise, management of depression/anxiety/stress to control compensatory eating, and can continue phentermine 30 mg daily for now.  Inspect reviewed.  Follow-up in office in 1 month..

## 2025-06-16 ENCOUNTER — TELEPHONE (OUTPATIENT)
Dept: ENDOCRINOLOGY | Facility: CLINIC | Age: 35
End: 2025-06-16
Payer: OTHER GOVERNMENT

## 2025-06-16 NOTE — TELEPHONE ENCOUNTER
Caller: JOSH CANTOR    Relationship: SELF    Best call back number: [phone]    What form or medical record are you requesting: LABS AND CT RESULTS    Who is requesting this form or medical record from you: [facility]    How would you like to receive the form or medical records (pick-up, mail, fax): FAX  If fax, what is the fax number: [phone]  If mail, what is the address:   If pick-up, provide patient with address and location details    Timeframe paperwork needed: ASAP    Additional notes: HER APPOINTMENT IS TOMORROW AND THEY ARE NEEDING EVERYTHING FAXED OVER TODAY ASAP. SHE GOT THIS APPOINTMENT LATE FRIDAY AFTERNOON. PLEASE ADVISE

## 2025-07-15 ENCOUNTER — OFFICE VISIT (OUTPATIENT)
Dept: FAMILY MEDICINE CLINIC | Facility: CLINIC | Age: 35
End: 2025-07-15
Payer: OTHER GOVERNMENT

## 2025-07-15 VITALS
HEART RATE: 78 BPM | HEIGHT: 65 IN | BODY MASS INDEX: 41.12 KG/M2 | DIASTOLIC BLOOD PRESSURE: 80 MMHG | WEIGHT: 246.8 LBS | RESPIRATION RATE: 18 BRPM | SYSTOLIC BLOOD PRESSURE: 108 MMHG | TEMPERATURE: 97.8 F | OXYGEN SATURATION: 98 %

## 2025-07-15 DIAGNOSIS — E66.813 CLASS 3 SEVERE OBESITY DUE TO EXCESS CALORIES WITHOUT SERIOUS COMORBIDITY WITH BODY MASS INDEX (BMI) OF 40.0 TO 44.9 IN ADULT: Primary | ICD-10-CM

## 2025-07-15 PROCEDURE — 99214 OFFICE O/P EST MOD 30 MIN: CPT | Performed by: NURSE PRACTITIONER

## 2025-07-15 NOTE — ASSESSMENT & PLAN NOTE
Start Zepbound 2.5 mg weekly injection x 4 weeks and send message with update at week 3.  Will increase dose if no side effects.  Short term goal: Loss of at least 5 percent of total body weight at three to six months.   Goal weight/End date: 165 pounds/1 year  Diet/Nutritional counseling:  Discussed balanced low-calorie, high-protein low-carbohydrate diet, Mediterranean diet. Discussed importance of dietary adherence.   Exercise: Discussed minimum of 150 minutes of exercise per week (such as brisk walking)  Behavior modification: Discussed modifying and monitoring food intake, increasing physical activity, and controlling environmental cues/stimuli that may trigger eating.   Follow-up in 3 months.

## 2025-07-15 NOTE — PROGRESS NOTES
"Chief Complaint  Weight Loss (Follow up )    Subjective        Stephenie Bridges presents to [facility]  History of Present Illness    Patient presents today to follow-up on obesity and month #3 of phentermine.  Reported visit with [facility] endocrinology for left adrenal nodule- scheduled appt with Dr. Suly Dsouza 7/29/25.       Obesity:   Status is unchanged.  Current medication includes phentermine 30 mg capsule daily (3 months completed with no improvement).   Current weight- 246 pounds   Goal weight- 165 pounds.   Diet- breakfast- oatmeal. Lunch- Mediterranean meal. Dinner- Lean cuisine.  Nuts and beef jerky in between.   Activity-currently working two jobs.  Gym on hold for now.  Constant activity at second job-lifting, moving, and standing for several hours.          Objective   Vital Signs:  /80 (BP Location: Left arm, Patient Position: Sitting, Cuff Size: Adult)   Pulse 78   Temp 97.8 °F (36.6 °C) (Temporal)   Resp 18   Ht 165.1 cm (65\")   Wt 112 kg (246 lb 12.8 oz)   SpO2 98%   BMI 41.07 kg/m²   Estimated body mass index is 41.07 kg/m² as calculated from the following:    Height as of this encounter: 165.1 cm (65\").    Weight as of this encounter: 112 kg (246 lb 12.8 oz).            Physical Exam  Constitutional:       General: She is not in acute distress.     Appearance: She is well-groomed. She is obese.   HENT:      Head: Normocephalic and atraumatic.      Right Ear: Tympanic membrane, ear canal and external ear normal.      Left Ear: Tympanic membrane, ear canal and external ear normal.      Nose: Nose normal.   Cardiovascular:      Rate and Rhythm: Normal rate and regular rhythm.      Heart sounds: Normal heart sounds, S1 normal and S2 normal.   Pulmonary:      Effort: Pulmonary effort is normal.      Breath sounds: Normal breath sounds and air entry.   Skin:     General: Skin is warm and dry.   Neurological:      Mental Status: She is alert and oriented to " person, place, and time.      Gait: Gait is intact.   Psychiatric:         Mood and Affect: Mood normal.         Thought Content: Thought content normal.         Judgment: Judgment normal.        Result Review :    CMP          12/4/2024    12:52 3/3/2025    09:26 4/23/2025    08:44   CMP   Glucose 125  91  84    BUN 9  11  13    Creatinine 0.68  0.75  0.76    EGFR 115.9  106.0  104.3    Sodium 137  140  138    Potassium 3.7  4.2  4.0    Chloride 103  106  104    Calcium 9.7  9.1  9.2    Total Protein 7.7  6.9     Albumin 4.5  4.0     Globulin 3.2  2.9     Total Bilirubin 0.4  0.4     Alkaline Phosphatase 74  76     AST (SGOT) 23  20     ALT (SGPT) 17  19     Albumin/Globulin Ratio 1.4  1.4     BUN/Creatinine Ratio 13.2  14.7  17.1    Anion Gap 11.0  11.0  11.0      CBC          12/4/2024    12:52 3/3/2025    09:26   CBC   WBC 10.90  7.63    RBC 4.50  4.38    Hemoglobin 13.2  13.0    Hematocrit 39.9  38.9    MCV 88.7  88.8    MCH 29.3  29.7    MCHC 33.1  33.4    RDW 12.8  12.9    Platelets 270  323      CBC w/diff          12/4/2024    12:52 3/3/2025    09:26   CBC w/Diff   WBC 10.90  7.63    RBC 4.50  4.38    Hemoglobin 13.2  13.0    Hematocrit 39.9  38.9    MCV 88.7  88.8    MCH 29.3  29.7    MCHC 33.1  33.4    RDW 12.8  12.9    Platelets 270  323    Neutrophil Rel % 70.6  55.5    Immature Granulocyte Rel % 0.2  0.5    Lymphocyte Rel % 21.9  34.3    Monocyte Rel % 6.1  7.9    Eosinophil Rel % 0.6  1.0    Basophil Rel % 0.6  0.8      Lipid Panel          3/3/2025    09:26   Lipid Panel   Total Cholesterol 167    Triglycerides 59    HDL Cholesterol 55    VLDL Cholesterol 12    LDL Cholesterol  100    LDL/HDL Ratio 1.82      TSH          3/3/2025    09:26   TSH   TSH 2.310      Most Recent A1C          3/3/2025    09:26   HGBA1C Most Recent   Hemoglobin A1C 5.10        UA          12/4/2024    11:15 3/3/2025    09:26   Urinalysis   Specific [place], UA  1.013    Ketones, UA Negative  Negative    Blood, UA  Negative     Leukocytes, UA Negative  Negative    Nitrite, UA  Negative                  Assessment and Plan   Diagnoses and all orders for this visit:    1. Class 3 severe obesity due to excess calories without serious comorbidity with body mass index (BMI) of 40.0 to 44.9 in adult (Primary)  Assessment & Plan:  Start Zepbound 2.5 mg weekly injection x 4 weeks and send message with update at week 3.  Will increase dose if no side effects.  Short term goal: Loss of at least 5 percent of total body weight at three to six months.   Goal weight/End date: 165 pounds/1 year  Diet/Nutritional counseling:  Discussed balanced low-calorie, high-protein low-carbohydrate diet, Mediterranean diet. Discussed importance of dietary adherence.   Exercise: Discussed minimum of 150 minutes of exercise per week (such as brisk walking)  Behavior modification: Discussed modifying and monitoring food intake, increasing physical activity, and controlling environmental cues/stimuli that may trigger eating.   Follow-up in 3 months.    Orders:  -     Tirzepatide-Weight Management (ZEPBOUND) 2.5 MG/0.5ML solution auto-injector; Inject 0.5 mL under the skin into the appropriate area as directed 1 (One) Time Per Week.  Dispense: 2 mL; Refill: 0             Follow Up   Return in about 3 months (around 10/15/2025).  Patient was given instructions and counseling regarding her condition or for health maintenance advice. Please see specific information pulled into the AVS if appropriate.

## 2025-07-25 ENCOUNTER — TELEMEDICINE (OUTPATIENT)
Dept: FAMILY MEDICINE CLINIC | Facility: TELEHEALTH | Age: 35
End: 2025-07-25
Payer: OTHER GOVERNMENT

## 2025-07-25 DIAGNOSIS — H92.02 LEFT EAR PAIN: Primary | ICD-10-CM

## 2025-07-25 DIAGNOSIS — J06.9 ACUTE URI: ICD-10-CM

## 2025-07-25 PROCEDURE — 99213 OFFICE O/P EST LOW 20 MIN: CPT | Performed by: NURSE PRACTITIONER

## 2025-07-25 RX ORDER — PREDNISONE 20 MG/1
20 TABLET ORAL 2 TIMES DAILY
Qty: 10 TABLET | Refills: 0 | Status: SHIPPED | OUTPATIENT
Start: 2025-07-25 | End: 2025-07-30

## 2025-07-25 NOTE — PROGRESS NOTES
BIANCA Bridges is a 34 y.o. female  presents with complaint of waking up today with sore throat, left earache and mild cough. Denies fever, chills or sweats. She reports she had a cold two weeks ago with cough, congestion. She took dayquil at that time then symptoms mostly resolved until today. She lives with other adults and they have been sick as well. She has not taken any medication today for symptoms. She has a history of ear infections, ear tubes, and tonsillectomy. She feels like she has been sick for weeks at this point and desires treatment.    Review of Systems    Past Medical History:   Diagnosis Date    ADHD (attention deficit hyperactivity disorder) 2018    Health examination of defined subpopulation 12/29/2023    Problems in relationship with spouse or partner 03/29/2018       Family History   Problem Relation Age of Onset    Anxiety disorder Mother     Alcohol abuse Father         Passed in 2011       Social History     Socioeconomic History    Marital status: Single    Number of children: 0    Years of education: 12   Tobacco Use    Smoking status: Former     Current packs/day: 0.50     Average packs/day: 0.5 packs/day for 22.6 years (11.3 ttl pk-yrs)     Types: Cigarettes     Start date: 2003     Passive exposure: Past    Smokeless tobacco: Never   Vaping Use    Vaping status: Every Day    Start date: 10/1/2023    Substances: Nicotine    Devices: Disposable   Substance and Sexual Activity    Alcohol use: Not Currently     Alcohol/week: 2.0 standard drinks of alcohol     Types: 2 Cans of beer per week     Comment: When i am not taking my meds I enjoy a special occasion    Drug use: Never    Sexual activity: Yes     Partners: Female, Male     Birth control/protection: Condom, I.U.D., Partner of same sex         There were no vitals taken for this visit.    PHYSICAL EXAM  Physical Exam   Constitutional: She appears well-developed and well-nourished.   HENT:   Head: Normocephalic.   Nose: Nose  normal.   Neck: Neck normal appearance.  Pulmonary/Chest: Effort normal.   Neurological: She is alert.   Psychiatric: She has a normal mood and affect. Her speech is normal.       Diagnoses and all orders for this visit:    1. Left ear pain (Primary)  -     amoxicillin-clavulanate (AUGMENTIN) 875-125 MG per tablet; Take 1 tablet by mouth 2 (Two) Times a Day for 7 days.  Dispense: 14 tablet; Refill: 0  -     predniSONE (DELTASONE) 20 MG tablet; Take 1 tablet by mouth 2 (Two) Times a Day for 5 days.  Dispense: 10 tablet; Refill: 0    2. Acute URI          FOLLOW-UP  As discussed during visit with [facility], if symptoms worsen or fail to improve, follow-up with PCP/Urgent Care/Emergency Department.    Patient verbalizes understanding of medications, instructions for treatment and follow-up.    Dori Patterson, ZITA  07/25/2025  10:06 EDT      Mode of Visit: Video  Location of patient: -WORK-  Location of provider: Home  You have chosen to receive care through a telehealth visit.  The patient has signed the video visit consent form.  The visit included audio and video interaction. No technical issues occurred during this visit.

## 2025-07-28 ENCOUNTER — TELEPHONE (OUTPATIENT)
Dept: FAMILY MEDICINE CLINIC | Facility: CLINIC | Age: 35
End: 2025-07-28
Payer: OTHER GOVERNMENT

## 2025-08-08 DIAGNOSIS — E66.813 CLASS 3 SEVERE OBESITY DUE TO EXCESS CALORIES WITHOUT SERIOUS COMORBIDITY WITH BODY MASS INDEX (BMI) OF 40.0 TO 44.9 IN ADULT: ICD-10-CM

## 2025-08-18 ENCOUNTER — HOSPITAL ENCOUNTER (EMERGENCY)
Facility: HOSPITAL | Age: 35
Discharge: HOME | End: 2025-08-18
Attending: EMERGENCY MEDICINE | Admitting: EMERGENCY MEDICINE
Payer: OTHER GOVERNMENT

## 2025-08-18 VITALS
SYSTOLIC BLOOD PRESSURE: 111 MMHG | HEART RATE: 86 BPM | WEIGHT: 246.91 LBS | OXYGEN SATURATION: 95 % | RESPIRATION RATE: 16 BRPM | BODY MASS INDEX: 41.14 KG/M2 | TEMPERATURE: 98.1 F | DIASTOLIC BLOOD PRESSURE: 67 MMHG | HEIGHT: 65 IN

## 2025-08-18 DIAGNOSIS — S09.90XA CLOSED HEAD INJURY, INITIAL ENCOUNTER: Primary | ICD-10-CM

## 2025-08-18 PROCEDURE — 99282 EMERGENCY DEPT VISIT SF MDM: CPT
